# Patient Record
Sex: FEMALE | Race: WHITE | NOT HISPANIC OR LATINO | Employment: OTHER | URBAN - METROPOLITAN AREA
[De-identification: names, ages, dates, MRNs, and addresses within clinical notes are randomized per-mention and may not be internally consistent; named-entity substitution may affect disease eponyms.]

---

## 2017-03-10 ENCOUNTER — COMMUNICATION - HEALTHEAST (OUTPATIENT)
Dept: ADMINISTRATIVE | Facility: CLINIC | Age: 57
End: 2017-03-10

## 2017-04-11 ENCOUNTER — AMBULATORY - HEALTHEAST (OUTPATIENT)
Dept: CARDIOLOGY | Facility: CLINIC | Age: 57
End: 2017-04-11

## 2017-04-11 DIAGNOSIS — Q21.3 TETRALOGY OF FALLOT: ICD-10-CM

## 2017-06-22 ENCOUNTER — HOSPITAL ENCOUNTER (OUTPATIENT)
Dept: MRI IMAGING | Facility: HOSPITAL | Age: 57
Discharge: HOME | End: 2017-06-22
Attending: INTERNAL MEDICINE

## 2017-06-22 DIAGNOSIS — Q21.3 TETRALOGY OF FALLOT: ICD-10-CM

## 2017-06-30 ENCOUNTER — OFFICE VISIT - HEALTHEAST (OUTPATIENT)
Dept: CARDIOLOGY | Facility: CLINIC | Age: 57
End: 2017-06-30

## 2017-06-30 DIAGNOSIS — I49.49 OTHER PREMATURE BEATS: ICD-10-CM

## 2017-06-30 DIAGNOSIS — I10 ESSENTIAL HYPERTENSION WITH GOAL BLOOD PRESSURE LESS THAN 130/80: ICD-10-CM

## 2017-06-30 DIAGNOSIS — Q21.3 TETRALOGY OF FALLOT: ICD-10-CM

## 2017-06-30 DIAGNOSIS — Z87.74 TETRALOGY OF FALLOT S/P REPAIR: ICD-10-CM

## 2017-06-30 ASSESSMENT — MIFFLIN-ST. JEOR: SCORE: 1673.72

## 2017-07-10 ENCOUNTER — COMMUNICATION - HEALTHEAST (OUTPATIENT)
Dept: CARDIOLOGY | Facility: CLINIC | Age: 57
End: 2017-07-10

## 2017-08-24 ENCOUNTER — RECORDS - HEALTHEAST (OUTPATIENT)
Dept: LAB | Facility: CLINIC | Age: 57
End: 2017-08-24

## 2017-08-24 LAB
CHOLEST SERPL-MCNC: 157 MG/DL
FASTING STATUS PATIENT QL REPORTED: YES
HDLC SERPL-MCNC: 65 MG/DL
LDLC SERPL CALC-MCNC: 77 MG/DL
TRIGL SERPL-MCNC: 75 MG/DL

## 2017-12-27 ENCOUNTER — COMMUNICATION - HEALTHEAST (OUTPATIENT)
Dept: CARDIOLOGY | Facility: CLINIC | Age: 57
End: 2017-12-27

## 2017-12-27 DIAGNOSIS — Z87.74 TETRALOGY OF FALLOT S/P REPAIR: ICD-10-CM

## 2017-12-27 DIAGNOSIS — E87.6 HYPOPOTASSEMIA: ICD-10-CM

## 2017-12-27 DIAGNOSIS — I49.1 SUPRAVENTRICULAR PREMATURE BEATS: ICD-10-CM

## 2017-12-27 DIAGNOSIS — Z96.652 STATUS POST TOTAL LEFT KNEE REPLACEMENT USING CEMENT: ICD-10-CM

## 2018-04-02 ENCOUNTER — RECORDS - HEALTHEAST (OUTPATIENT)
Dept: LAB | Facility: CLINIC | Age: 58
End: 2018-04-02

## 2018-04-03 ENCOUNTER — AMBULATORY - HEALTHEAST (OUTPATIENT)
Dept: CARDIOLOGY | Facility: CLINIC | Age: 58
End: 2018-04-03

## 2018-04-03 DIAGNOSIS — I49.3 PVC'S (PREMATURE VENTRICULAR CONTRACTIONS): ICD-10-CM

## 2018-04-03 DIAGNOSIS — I45.10 RBBB: ICD-10-CM

## 2018-04-03 LAB
BASOPHILS # BLD AUTO: 0.1 THOU/UL (ref 0–0.2)
BASOPHILS NFR BLD AUTO: 1 % (ref 0–2)
EOSINOPHIL # BLD AUTO: 0.2 THOU/UL (ref 0–0.4)
EOSINOPHIL NFR BLD AUTO: 3 % (ref 0–6)
ERYTHROCYTE [DISTWIDTH] IN BLOOD BY AUTOMATED COUNT: 20.1 % (ref 11–14.5)
HCT VFR BLD AUTO: 32.3 % (ref 35–47)
HGB BLD-MCNC: 9.6 G/DL (ref 12–16)
LYMPHOCYTES # BLD AUTO: 2.7 THOU/UL (ref 0.8–4.4)
LYMPHOCYTES NFR BLD AUTO: 29 % (ref 20–40)
MCH RBC QN AUTO: 21.8 PG (ref 27–34)
MCHC RBC AUTO-ENTMCNC: 29.7 G/DL (ref 32–36)
MCV RBC AUTO: 73 FL (ref 80–100)
MONOCYTES # BLD AUTO: 1 THOU/UL (ref 0–0.9)
MONOCYTES NFR BLD AUTO: 10 % (ref 2–10)
NEUTROPHILS # BLD AUTO: 5.3 THOU/UL (ref 2–7.7)
NEUTROPHILS NFR BLD AUTO: 57 % (ref 50–70)
PLATELET # BLD AUTO: 325 THOU/UL (ref 140–440)
PMV BLD AUTO: 11.3 FL (ref 8.5–12.5)
POTASSIUM BLD-SCNC: 4 MMOL/L (ref 3.5–5)
RBC # BLD AUTO: 4.41 MILL/UL (ref 3.8–5.4)
WBC: 9.3 THOU/UL (ref 4–11)

## 2018-06-08 ENCOUNTER — RECORDS - HEALTHEAST (OUTPATIENT)
Dept: LAB | Facility: CLINIC | Age: 58
End: 2018-06-08

## 2018-06-08 LAB
BASOPHILS # BLD AUTO: 0.1 THOU/UL (ref 0–0.2)
BASOPHILS NFR BLD AUTO: 1 % (ref 0–2)
EOSINOPHIL # BLD AUTO: 0.1 THOU/UL (ref 0–0.4)
EOSINOPHIL NFR BLD AUTO: 1 % (ref 0–6)
ERYTHROCYTE [DISTWIDTH] IN BLOOD BY AUTOMATED COUNT: 19.6 % (ref 11–14.5)
HCT VFR BLD AUTO: 35.6 % (ref 35–47)
HGB BLD-MCNC: 10.3 G/DL (ref 12–16)
LYMPHOCYTES # BLD AUTO: 2.5 THOU/UL (ref 0.8–4.4)
LYMPHOCYTES NFR BLD AUTO: 28 % (ref 20–40)
MCH RBC QN AUTO: 21.2 PG (ref 27–34)
MCHC RBC AUTO-ENTMCNC: 28.9 G/DL (ref 32–36)
MCV RBC AUTO: 73 FL (ref 80–100)
MONOCYTES # BLD AUTO: 0.8 THOU/UL (ref 0–0.9)
MONOCYTES NFR BLD AUTO: 9 % (ref 2–10)
NEUTROPHILS # BLD AUTO: 5.6 THOU/UL (ref 2–7.7)
NEUTROPHILS NFR BLD AUTO: 62 % (ref 50–70)
PLATELET # BLD AUTO: 317 THOU/UL (ref 140–440)
PMV BLD AUTO: 10.7 FL (ref 8.5–12.5)
RBC # BLD AUTO: 4.85 MILL/UL (ref 3.8–5.4)
WBC: 9.2 THOU/UL (ref 4–11)

## 2018-06-09 LAB
BASOPHILS # BLD AUTO: 0.1 THOU/UL (ref 0–0.2)
BASOPHILS NFR BLD AUTO: 1 % (ref 0–2)
EOSINOPHIL # BLD AUTO: 0.1 THOU/UL (ref 0–0.4)
EOSINOPHIL NFR BLD AUTO: 1 % (ref 0–6)
ERYTHROCYTE [DISTWIDTH] IN BLOOD BY AUTOMATED COUNT: 19.6 % (ref 11–14.5)
HCT VFR BLD AUTO: 35.6 % (ref 35–47)
HGB BLD-MCNC: 10.3 G/DL (ref 12–16)
LYMPHOCYTES # BLD AUTO: 2.5 THOU/UL (ref 0.8–4.4)
LYMPHOCYTES NFR BLD AUTO: 28 % (ref 20–40)
MCH RBC QN AUTO: 21.2 PG (ref 27–34)
MCHC RBC AUTO-ENTMCNC: 28.9 G/DL (ref 32–36)
MCV RBC AUTO: 73 FL (ref 80–100)
MONOCYTES # BLD AUTO: 0.8 THOU/UL (ref 0–0.9)
MONOCYTES NFR BLD AUTO: 9 % (ref 2–10)
NEUTROPHILS # BLD AUTO: 5.6 THOU/UL (ref 2–7.7)
NEUTROPHILS NFR BLD AUTO: 62 % (ref 50–70)
OVALOCYTES: ABNORMAL
PATH REPORT.MICROSCOPIC SPEC OTHER STN: ABNORMAL
PLAT MORPH BLD: NORMAL
PLATELET # BLD AUTO: 317 THOU/UL (ref 140–440)
PMV BLD AUTO: 10.7 FL (ref 8.5–12.5)
RBC # BLD AUTO: 4.85 MILL/UL (ref 3.8–5.4)
TOXIC GRANULATION: ABNORMAL
WBC: 9.2 THOU/UL (ref 4–11)

## 2018-06-11 LAB
LAB AP CHARGES (HE HISTORICAL CONVERSION): NORMAL
PATH REPORT.COMMENTS IMP SPEC: NORMAL
PATH REPORT.COMMENTS IMP SPEC: NORMAL
PATH REPORT.FINAL DX SPEC: NORMAL
PATH REPORT.RELEVANT HX SPEC: NORMAL

## 2018-07-19 ENCOUNTER — AMBULATORY - HEALTHEAST (OUTPATIENT)
Dept: CARDIOLOGY | Facility: CLINIC | Age: 58
End: 2018-07-19

## 2018-07-19 DIAGNOSIS — I10 ESSENTIAL HYPERTENSION: ICD-10-CM

## 2018-07-19 DIAGNOSIS — Z87.74 STATUS POST REPAIR OF TETRALOGY OF FALLOT: ICD-10-CM

## 2018-07-19 DIAGNOSIS — Q21.3 TETRALOGY OF FALLOT: ICD-10-CM

## 2018-07-19 DIAGNOSIS — I49.3 PVC'S (PREMATURE VENTRICULAR CONTRACTIONS): ICD-10-CM

## 2018-07-20 ENCOUNTER — COMMUNICATION - HEALTHEAST (OUTPATIENT)
Dept: ADMINISTRATIVE | Facility: CLINIC | Age: 58
End: 2018-07-20

## 2018-08-31 ENCOUNTER — RECORDS - HEALTHEAST (OUTPATIENT)
Dept: LAB | Facility: CLINIC | Age: 58
End: 2018-08-31

## 2018-08-31 LAB
BASOPHILS # BLD AUTO: 0.1 THOU/UL (ref 0–0.2)
BASOPHILS NFR BLD AUTO: 1 % (ref 0–2)
EOSINOPHIL # BLD AUTO: 0.2 THOU/UL (ref 0–0.4)
EOSINOPHIL NFR BLD AUTO: 2 % (ref 0–6)
ERYTHROCYTE [DISTWIDTH] IN BLOOD BY AUTOMATED COUNT: 20.8 % (ref 11–14.5)
HCT VFR BLD AUTO: 35 % (ref 35–47)
HGB BLD-MCNC: 10.6 G/DL (ref 12–16)
LYMPHOCYTES # BLD AUTO: 2.3 THOU/UL (ref 0.8–4.4)
LYMPHOCYTES NFR BLD AUTO: 28 % (ref 20–40)
MCH RBC QN AUTO: 22 PG (ref 27–34)
MCHC RBC AUTO-ENTMCNC: 30.3 G/DL (ref 32–36)
MCV RBC AUTO: 73 FL (ref 80–100)
MONOCYTES # BLD AUTO: 0.9 THOU/UL (ref 0–0.9)
MONOCYTES NFR BLD AUTO: 11 % (ref 2–10)
NEUTROPHILS # BLD AUTO: 4.8 THOU/UL (ref 2–7.7)
NEUTROPHILS NFR BLD AUTO: 58 % (ref 50–70)
OVALOCYTES: ABNORMAL
PLAT MORPH BLD: NORMAL
PLATELET # BLD AUTO: 308 THOU/UL (ref 140–440)
PMV BLD AUTO: 11.2 FL (ref 8.5–12.5)
RBC # BLD AUTO: 4.82 MILL/UL (ref 3.8–5.4)
WBC: 8.3 THOU/UL (ref 4–11)

## 2018-09-11 ENCOUNTER — RECORDS - HEALTHEAST (OUTPATIENT)
Dept: LAB | Facility: CLINIC | Age: 58
End: 2018-09-11

## 2018-09-11 LAB
ALBUMIN SERPL-MCNC: 3.9 G/DL (ref 3.5–5)
ANION GAP SERPL CALCULATED.3IONS-SCNC: 10 MMOL/L (ref 5–18)
BASOPHILS # BLD AUTO: 0.1 THOU/UL (ref 0–0.2)
BASOPHILS NFR BLD AUTO: 1 % (ref 0–2)
BNP SERPL-MCNC: 52 PG/ML (ref 0–87)
BUN SERPL-MCNC: 11 MG/DL (ref 8–22)
CALCIUM SERPL-MCNC: 9.5 MG/DL (ref 8.5–10.5)
CHLORIDE BLD-SCNC: 105 MMOL/L (ref 98–107)
CO2 SERPL-SCNC: 26 MMOL/L (ref 22–31)
CREAT SERPL-MCNC: 0.67 MG/DL (ref 0.6–1.1)
EOSINOPHIL # BLD AUTO: 0.2 THOU/UL (ref 0–0.4)
EOSINOPHIL NFR BLD AUTO: 2 % (ref 0–6)
ERYTHROCYTE [DISTWIDTH] IN BLOOD BY AUTOMATED COUNT: 20.9 % (ref 11–14.5)
FERRITIN SERPL-MCNC: 14 NG/ML (ref 10–130)
GFR SERPL CREATININE-BSD FRML MDRD: >60 ML/MIN/1.73M2
GLUCOSE BLD-MCNC: 81 MG/DL (ref 70–125)
HCT VFR BLD AUTO: 37.1 % (ref 35–47)
HGB BLD-MCNC: 11.4 G/DL (ref 12–16)
IRON SATN MFR SERPL: 5 % (ref 20–50)
IRON SERPL-MCNC: 25 UG/DL (ref 42–175)
LYMPHOCYTES # BLD AUTO: 2.5 THOU/UL (ref 0.8–4.4)
LYMPHOCYTES NFR BLD AUTO: 28 % (ref 20–40)
MCH RBC QN AUTO: 22.2 PG (ref 27–34)
MCHC RBC AUTO-ENTMCNC: 30.7 G/DL (ref 32–36)
MCV RBC AUTO: 72 FL (ref 80–100)
MONOCYTES # BLD AUTO: 0.9 THOU/UL (ref 0–0.9)
MONOCYTES NFR BLD AUTO: 10 % (ref 2–10)
NEUTROPHILS # BLD AUTO: 5.2 THOU/UL (ref 2–7.7)
NEUTROPHILS NFR BLD AUTO: 59 % (ref 50–70)
OVALOCYTES: ABNORMAL
PHOSPHATE SERPL-MCNC: 4.1 MG/DL (ref 2.5–4.5)
PLAT MORPH BLD: NORMAL
PLATELET # BLD AUTO: 292 THOU/UL (ref 140–440)
PMV BLD AUTO: 10.3 FL (ref 8.5–12.5)
POLYCHROMASIA BLD QL SMEAR: ABNORMAL
POTASSIUM BLD-SCNC: 4.1 MMOL/L (ref 3.5–5)
RBC # BLD AUTO: 5.13 MILL/UL (ref 3.8–5.4)
SODIUM SERPL-SCNC: 141 MMOL/L (ref 136–145)
TIBC SERPL-MCNC: 510 UG/DL (ref 313–563)
TRANSFERRIN SERPL-MCNC: 408 MG/DL (ref 212–360)
WBC: 8.9 THOU/UL (ref 4–11)

## 2018-12-02 ENCOUNTER — COMMUNICATION - HEALTHEAST (OUTPATIENT)
Dept: CARDIOLOGY | Facility: CLINIC | Age: 58
End: 2018-12-02

## 2018-12-02 DIAGNOSIS — Z87.74 TETRALOGY OF FALLOT S/P REPAIR: ICD-10-CM

## 2018-12-02 DIAGNOSIS — Z96.652 STATUS POST TOTAL LEFT KNEE REPLACEMENT USING CEMENT: ICD-10-CM

## 2018-12-02 DIAGNOSIS — I49.1 SUPRAVENTRICULAR PREMATURE BEATS: ICD-10-CM

## 2018-12-02 DIAGNOSIS — E87.6 HYPOPOTASSEMIA: ICD-10-CM

## 2018-12-07 ENCOUNTER — RECORDS - HEALTHEAST (OUTPATIENT)
Dept: LAB | Facility: CLINIC | Age: 58
End: 2018-12-07

## 2018-12-07 LAB
25(OH)D3 SERPL-MCNC: 42.2 NG/ML (ref 30–80)
ALBUMIN SERPL-MCNC: 3.5 G/DL (ref 3.5–5)
ALP SERPL-CCNC: 112 U/L (ref 45–120)
ALT SERPL W P-5'-P-CCNC: 16 U/L (ref 0–45)
ANION GAP SERPL CALCULATED.3IONS-SCNC: 6 MMOL/L (ref 5–18)
AST SERPL W P-5'-P-CCNC: 18 U/L (ref 0–40)
BASOPHILS # BLD AUTO: 0.1 THOU/UL (ref 0–0.2)
BASOPHILS NFR BLD AUTO: 1 % (ref 0–2)
BILIRUB SERPL-MCNC: 0.4 MG/DL (ref 0–1)
BUN SERPL-MCNC: 15 MG/DL (ref 8–22)
CALCIUM SERPL-MCNC: 9.2 MG/DL (ref 8.5–10.5)
CHLORIDE BLD-SCNC: 103 MMOL/L (ref 98–107)
CHOLEST SERPL-MCNC: 155 MG/DL
CO2 SERPL-SCNC: 29 MMOL/L (ref 22–31)
CREAT SERPL-MCNC: 0.77 MG/DL (ref 0.6–1.1)
EOSINOPHIL # BLD AUTO: 0.3 THOU/UL (ref 0–0.4)
EOSINOPHIL NFR BLD AUTO: 4 % (ref 0–6)
ERYTHROCYTE [DISTWIDTH] IN BLOOD BY AUTOMATED COUNT: 19.1 % (ref 11–14.5)
FASTING STATUS PATIENT QL REPORTED: YES
FERRITIN SERPL-MCNC: 19 NG/ML (ref 10–130)
FOLATE SERPL-MCNC: 7.1 NG/ML
GFR SERPL CREATININE-BSD FRML MDRD: >60 ML/MIN/1.73M2
GLUCOSE BLD-MCNC: 86 MG/DL (ref 70–125)
HCT VFR BLD AUTO: 36.5 % (ref 35–47)
HDLC SERPL-MCNC: 64 MG/DL
HGB BLD-MCNC: 11.1 G/DL (ref 12–16)
IRON SATN MFR SERPL: 5 % (ref 20–50)
IRON SERPL-MCNC: 26 UG/DL (ref 42–175)
LDLC SERPL CALC-MCNC: 76 MG/DL
LYMPHOCYTES # BLD AUTO: 2.4 THOU/UL (ref 0.8–4.4)
LYMPHOCYTES NFR BLD AUTO: 32 % (ref 20–40)
MAGNESIUM SERPL-MCNC: 2.3 MG/DL (ref 1.8–2.6)
MCH RBC QN AUTO: 23.1 PG (ref 27–34)
MCHC RBC AUTO-ENTMCNC: 30.4 G/DL (ref 32–36)
MCV RBC AUTO: 76 FL (ref 80–100)
MONOCYTES # BLD AUTO: 0.7 THOU/UL (ref 0–0.9)
MONOCYTES NFR BLD AUTO: 10 % (ref 2–10)
NEUTROPHILS # BLD AUTO: 4.1 THOU/UL (ref 2–7.7)
NEUTROPHILS NFR BLD AUTO: 54 % (ref 50–70)
PLATELET # BLD AUTO: 293 THOU/UL (ref 140–440)
PMV BLD AUTO: 10 FL (ref 8.5–12.5)
POTASSIUM BLD-SCNC: 3.8 MMOL/L (ref 3.5–5)
PROT SERPL-MCNC: 7 G/DL (ref 6–8)
PTH-INTACT SERPL-MCNC: 81 PG/ML (ref 10–86)
RBC # BLD AUTO: 4.8 MILL/UL (ref 3.8–5.4)
SODIUM SERPL-SCNC: 138 MMOL/L (ref 136–145)
T4 FREE SERPL-MCNC: 0.5 NG/DL (ref 0.7–1.8)
TIBC SERPL-MCNC: 492 UG/DL (ref 313–563)
TRANSFERRIN SERPL-MCNC: 394 MG/DL (ref 212–360)
TRIGL SERPL-MCNC: 75 MG/DL
TSH SERPL DL<=0.005 MIU/L-ACNC: 1.15 UIU/ML (ref 0.3–5)
VIT B12 SERPL-MCNC: 812 PG/ML (ref 213–816)
WBC: 7.6 THOU/UL (ref 4–11)

## 2018-12-09 LAB
COPPER SERPL-MCNC: 148 UG/DL (ref 80–155)
ZINC SERPL-MCNC: 63 UG/DL (ref 60–120)

## 2018-12-10 LAB
ANNOTATION COMMENT IMP: NORMAL
VIT A SERPL-MCNC: 0.49 MG/L (ref 0.3–1.2)
VITAMIN A (RETINYL PALMITATE): 0.02 MG/L (ref 0–0.1)

## 2018-12-11 LAB — VIT B1 PYROPHOSHATE BLD-SCNC: 105 NMOL/L (ref 70–180)

## 2018-12-15 ENCOUNTER — COMMUNICATION - HEALTHEAST (OUTPATIENT)
Dept: CARDIOLOGY | Facility: CLINIC | Age: 58
End: 2018-12-15

## 2018-12-15 DIAGNOSIS — I49.1 SUPRAVENTRICULAR PREMATURE BEATS: ICD-10-CM

## 2018-12-15 DIAGNOSIS — E87.6 HYPOPOTASSEMIA: ICD-10-CM

## 2018-12-15 DIAGNOSIS — Z87.74 TETRALOGY OF FALLOT S/P REPAIR: ICD-10-CM

## 2018-12-15 DIAGNOSIS — Z96.652 STATUS POST TOTAL LEFT KNEE REPLACEMENT USING CEMENT: ICD-10-CM

## 2019-02-11 ENCOUNTER — COMMUNICATION - HEALTHEAST (OUTPATIENT)
Dept: CARDIOLOGY | Facility: CLINIC | Age: 59
End: 2019-02-11

## 2019-02-11 DIAGNOSIS — Z87.74 TETRALOGY OF FALLOT S/P REPAIR: ICD-10-CM

## 2019-02-11 DIAGNOSIS — Z96.652 STATUS POST TOTAL LEFT KNEE REPLACEMENT USING CEMENT: ICD-10-CM

## 2019-02-11 DIAGNOSIS — I49.1 SUPRAVENTRICULAR PREMATURE BEATS: ICD-10-CM

## 2019-02-11 DIAGNOSIS — E87.6 HYPOPOTASSEMIA: ICD-10-CM

## 2019-02-15 ENCOUNTER — COMMUNICATION - HEALTHEAST (OUTPATIENT)
Dept: CARDIOLOGY | Facility: CLINIC | Age: 59
End: 2019-02-15

## 2019-02-15 DIAGNOSIS — I49.1 SUPRAVENTRICULAR PREMATURE BEATS: ICD-10-CM

## 2019-02-15 DIAGNOSIS — E87.6 HYPOPOTASSEMIA: ICD-10-CM

## 2019-02-15 DIAGNOSIS — Z87.74 TETRALOGY OF FALLOT S/P REPAIR: ICD-10-CM

## 2019-02-15 DIAGNOSIS — Z96.652 STATUS POST TOTAL LEFT KNEE REPLACEMENT USING CEMENT: ICD-10-CM

## 2019-03-14 ENCOUNTER — RECORDS - HEALTHEAST (OUTPATIENT)
Dept: LAB | Facility: CLINIC | Age: 59
End: 2019-03-14

## 2019-03-14 LAB
FERRITIN SERPL-MCNC: 20 NG/ML (ref 10–130)
IRON SATN MFR SERPL: 6 % (ref 20–50)
IRON SERPL-MCNC: 27 UG/DL (ref 42–175)
TIBC SERPL-MCNC: 456 UG/DL (ref 313–563)
TRANSFERRIN SERPL-MCNC: 365 MG/DL (ref 212–360)

## 2019-06-17 ENCOUNTER — RECORDS - HEALTHEAST (OUTPATIENT)
Dept: LAB | Facility: CLINIC | Age: 59
End: 2019-06-17

## 2019-06-17 LAB
ALBUMIN SERPL-MCNC: 3.4 G/DL (ref 3.5–5)
ALP SERPL-CCNC: 104 U/L (ref 45–120)
ALT SERPL W P-5'-P-CCNC: 19 U/L (ref 0–45)
ANION GAP SERPL CALCULATED.3IONS-SCNC: 5 MMOL/L (ref 5–18)
AST SERPL W P-5'-P-CCNC: 18 U/L (ref 0–40)
BILIRUB SERPL-MCNC: 0.3 MG/DL (ref 0–1)
BUN SERPL-MCNC: 9 MG/DL (ref 8–22)
CALCIUM SERPL-MCNC: 9 MG/DL (ref 8.5–10.5)
CHLORIDE BLD-SCNC: 106 MMOL/L (ref 98–107)
CHOLEST SERPL-MCNC: 157 MG/DL
CO2 SERPL-SCNC: 29 MMOL/L (ref 22–31)
CREAT SERPL-MCNC: 0.58 MG/DL (ref 0.6–1.1)
FASTING STATUS PATIENT QL REPORTED: NO
GFR SERPL CREATININE-BSD FRML MDRD: >60 ML/MIN/1.73M2
GLUCOSE BLD-MCNC: 66 MG/DL (ref 70–125)
HDLC SERPL-MCNC: 57 MG/DL
IRON SATN MFR SERPL: 19 % (ref 20–50)
IRON SERPL-MCNC: 78 UG/DL (ref 42–175)
LDLC SERPL CALC-MCNC: 81 MG/DL
MAGNESIUM SERPL-MCNC: 2 MG/DL (ref 1.8–2.6)
POTASSIUM BLD-SCNC: 4.2 MMOL/L (ref 3.5–5)
PROT SERPL-MCNC: 5.8 G/DL (ref 6–8)
SODIUM SERPL-SCNC: 140 MMOL/L (ref 136–145)
TIBC SERPL-MCNC: 407 UG/DL (ref 313–563)
TRANSFERRIN SERPL-MCNC: 325 MG/DL (ref 212–360)
TRIGL SERPL-MCNC: 95 MG/DL

## 2019-06-27 ENCOUNTER — RECORDS - HEALTHEAST (OUTPATIENT)
Dept: ADMINISTRATIVE | Facility: OTHER | Age: 59
End: 2019-06-27

## 2019-06-27 ENCOUNTER — AMBULATORY - HEALTHEAST (OUTPATIENT)
Dept: CARDIOLOGY | Facility: CLINIC | Age: 59
End: 2019-06-27

## 2019-07-02 ENCOUNTER — OFFICE VISIT - HEALTHEAST (OUTPATIENT)
Dept: CARDIOLOGY | Facility: CLINIC | Age: 59
End: 2019-07-02

## 2019-07-02 DIAGNOSIS — I49.3 PVC'S (PREMATURE VENTRICULAR CONTRACTIONS): ICD-10-CM

## 2019-07-02 DIAGNOSIS — Q21.3 TETRALOGY OF FALLOT: ICD-10-CM

## 2019-07-02 RX ORDER — DULOXETIN HYDROCHLORIDE 30 MG/1
60 CAPSULE, DELAYED RELEASE ORAL EVERY MORNING
Status: SHIPPED | COMMUNITY
Start: 2019-07-02

## 2019-07-02 ASSESSMENT — MIFFLIN-ST. JEOR: SCORE: 1699.57

## 2019-07-03 LAB
ATRIAL RATE - MUSE: 75 BPM
DIASTOLIC BLOOD PRESSURE - MUSE: NORMAL MMHG
INTERPRETATION ECG - MUSE: NORMAL
P AXIS - MUSE: 49 DEGREES
PR INTERVAL - MUSE: 142 MS
QRS DURATION - MUSE: 152 MS
QT - MUSE: 418 MS
QTC - MUSE: 466 MS
R AXIS - MUSE: 18 DEGREES
SYSTOLIC BLOOD PRESSURE - MUSE: NORMAL MMHG
T AXIS - MUSE: 86 DEGREES
VENTRICULAR RATE- MUSE: 75 BPM

## 2019-07-18 ENCOUNTER — HOSPITAL ENCOUNTER (OUTPATIENT)
Dept: CARDIOLOGY | Facility: CLINIC | Age: 59
Discharge: HOME | End: 2019-07-18
Attending: INTERNAL MEDICINE

## 2019-07-18 DIAGNOSIS — Q21.3 TETRALOGY OF FALLOT: ICD-10-CM

## 2019-07-18 DIAGNOSIS — I49.3 PVC'S (PREMATURE VENTRICULAR CONTRACTIONS): ICD-10-CM

## 2019-07-18 ASSESSMENT — MIFFLIN-ST. JEOR: SCORE: 1699.09

## 2019-07-19 LAB
AORTIC ROOT: 3.7 CM
BSA FOR ECHO PROCEDURE: 2.27 M2
CV BLOOD PRESSURE: ABNORMAL MMHG
CV ECHO HEIGHT: 63.8 IN
CV ECHO WEIGHT: 252 LBS
DOP CALC LVOT AREA: 2.83 CM2
DOP CALC LVOT DIAMETER: 1.9 CM
DOP CALC LVOT PEAK VEL: 96.8 CM/S
DOP CALC LVOT STROKE VOLUME: 55.5 CM3
DOP CALC QP:QS RATIO: 1.5
DOP CALC RVOT AREA: 3.14 CM2
DOP CALC RVOT DIAMETER: 2 CM
DOP CALC RVOT PEAK VEL: 116 CM/S
DOP CALC RVOT STROKE VOLUME: 84.2 CM3
DOP CALC RVOT VTI: 26.8 CM
DOP CALCLVOT PEAK VEL VTI: 19.6 CM
EJECTION FRACTION: 64 % (ref 55–75)
FRACTIONAL SHORTENING: 36 % (ref 28–44)
INTERVENTRICULAR SEPTUM IN END DIASTOLE: 1.15 CM (ref 0.6–0.9)
IVS/PW RATIO: 1
LEFT ATRIUM SIZE: 4.1 CM
LEFT ATRIUM TO AORTIC ROOT RATIO: 1.11 NO UNITS
LEFT VENTRICLE CARDIAC INDEX: 2.1 L/MIN/M2
LEFT VENTRICLE CARDIAC OUTPUT: 4.8 L/MIN
LEFT VENTRICLE DIASTOLIC VOLUME INDEX: 46.3 CM3/M2 (ref 29–61)
LEFT VENTRICLE DIASTOLIC VOLUME: 105 CM3 (ref 46–106)
LEFT VENTRICLE HEART RATE: 87 BPM
LEFT VENTRICLE MASS INDEX: 102.1 G/M2
LEFT VENTRICLE SYSTOLIC VOLUME INDEX: 16.5 CM3/M2 (ref 8–24)
LEFT VENTRICLE SYSTOLIC VOLUME: 37.5 CM3 (ref 14–42)
LEFT VENTRICULAR INTERNAL DIMENSION IN DIASTOLE: 5.16 CM (ref 3.8–5.2)
LEFT VENTRICULAR INTERNAL DIMENSION IN SYSTOLE: 3.3 CM (ref 2.2–3.5)
LEFT VENTRICULAR MASS: 231.7 G
LEFT VENTRICULAR OUTFLOW TRACT MEAN GRADIENT: 2 MMHG
LEFT VENTRICULAR OUTFLOW TRACT MEAN VELOCITY: 71.5 CM/S
LEFT VENTRICULAR OUTFLOW TRACT PEAK GRADIENT: 4 MMHG
LEFT VENTRICULAR POSTERIOR WALL IN END DIASTOLE: 1.15 CM (ref 0.6–0.9)
LV STROKE VOLUME INDEX: 24.5 ML/M2
MITRAL VALVE E/A RATIO: 1.1
MV AVERAGE E/E' RATIO: 8.6 CM/S
MV DECELERATION TIME: 218 MS
MV E'TISSUE VEL-LAT: 14.3 CM/S
MV E'TISSUE VEL-MED: 8.8 CM/S
MV LATERAL E/E' RATIO: 7
MV MEDIAL E/E' RATIO: 11.3
MV PEAK A VELOCITY: 89.2 CM/S
MV PEAK E VELOCITY: 99.4 CM/S
NUC REST DIASTOLIC VOLUME INDEX: 4032 LBS
NUC REST SYSTOLIC VOLUME INDEX: 63.75 IN
PV MEAN GRADIENT: 18 MMHG
PV MEAN VELOCITY: 196 CM/S
PV PEAK GRADIENT: 28.9 MMHG
PV VALVE AREA: 1.2 CM2
PV VELOCITY TIME INTERVAL: 70.1 CM
PV VMAX: 269 CM/S
RIGHT VENTRICULAR OUTFLOW PEAK GRADIENT: 5 MMHG
RIGHT VENTRICULAR OUTFLOW TRACT MEAN GRADIENT: 3 MMHG
RVOT MV: 86 CM/S
TRICUSPID REGURGITATION PEAK PRESSURE GRADIENT: 30.9 MMHG
TRICUSPID VALVE PEAK REGURGITANT VELOCITY: 278 CM/S

## 2019-07-29 ENCOUNTER — COMMUNICATION - HEALTHEAST (OUTPATIENT)
Dept: CARDIOLOGY | Facility: CLINIC | Age: 59
End: 2019-07-29

## 2019-07-29 DIAGNOSIS — Z87.74 TETRALOGY OF FALLOT S/P REPAIR: ICD-10-CM

## 2019-07-29 DIAGNOSIS — Q21.3 TETRALOGY OF FALLOT: ICD-10-CM

## 2019-08-07 ENCOUNTER — COMMUNICATION - HEALTHEAST (OUTPATIENT)
Dept: CARDIOLOGY | Facility: CLINIC | Age: 59
End: 2019-08-07

## 2019-08-09 ENCOUNTER — HOSPITAL ENCOUNTER (OUTPATIENT)
Dept: MRI IMAGING | Facility: HOSPITAL | Age: 59
Discharge: HOME | End: 2019-08-09
Attending: INTERNAL MEDICINE

## 2019-08-09 DIAGNOSIS — Z87.74 TETRALOGY OF FALLOT S/P REPAIR: ICD-10-CM

## 2019-08-09 LAB
CREAT BLD-MCNC: 0.6 MG/DL (ref 0.6–1.1)
GFR SERPL CREATININE-BSD FRML MDRD: >60 ML/MIN/1.73M2

## 2019-08-13 LAB
CCTA EJECTION FRACTION: 56 %
CCTA INTERVENTRICULAR SETPUM: 0.9 CM (ref 0.6–1.1)
CCTA LEFT INTERNAL DIMENSION IN SYSTOLE: 3.9 CM (ref 2.1–4)
CCTA LEFT VENTRICULAR INTERNAL DIMENSION IN DIASTOLE: 5.8 CM (ref 3.5–6)
CCTA LEFT VENTRICULAR MASS: 203.49 G
CCTA POSTERIOR WALL: 0.9 CM (ref 0.6–1.1)
MR CARDIAC LEFT VENTRIAL CARDIAC INDEX: 3.9 L/MIN/M2 (ref 1.75–3.8)
MR CARDIAC LEFT VENTRICAL CARDIAC OUTPUT: 8.3 L/MIN (ref 2.8–8.8)
MR CARDIAC LEFT VENTRICULAR DIASTOLIC VOLUME INDEX: 84.59 ML/M2 (ref 41–81)
MR CARDIAC LEFT VENTRICULAR MASS INDEX: 63.21 G/M2 (ref 63–95)
MR CARDIAC LEFT VENTRICULAR MASS: 136 G (ref 75–175)
MR CARDIAC LEFT VENTRICULAR STROKE VOLUME INDEX: 38.58 ML/M2 (ref 26–56)
MR CARDIAC LEFT VENTRICULAR SYSTOLIC VOLUME INDEX: 46.01 ML/M2 (ref 12–20)
MR EJECTION FRACTION: 45.6 %
MR HEIGHT: 1.62 M
MR LEFT VENTRICULAR DYSTOLIC VOLUMEN: 182 ML (ref 52–141)
MR LEFT VENTRICULAR STROKE VOLUMEN: 83 ML (ref 33–97)
MR LEFT VENTRICULAR SYSTOLIC VOLUME: 99 ML (ref 13–51)
MR WEIGHT: 114.3 KG

## 2019-08-15 ENCOUNTER — COMMUNICATION - HEALTHEAST (OUTPATIENT)
Dept: CARDIOLOGY | Facility: CLINIC | Age: 59
End: 2019-08-15

## 2019-09-15 ENCOUNTER — COMMUNICATION - HEALTHEAST (OUTPATIENT)
Dept: CARDIOLOGY | Facility: CLINIC | Age: 59
End: 2019-09-15

## 2019-10-10 ENCOUNTER — RECORDS - HEALTHEAST (OUTPATIENT)
Dept: LAB | Facility: CLINIC | Age: 59
End: 2019-10-10

## 2019-10-11 ENCOUNTER — COMMUNICATION - HEALTHEAST (OUTPATIENT)
Dept: CARDIOLOGY | Facility: CLINIC | Age: 59
End: 2019-10-11

## 2019-10-11 LAB
ALBUMIN SERPL-MCNC: 3.7 G/DL (ref 3.5–5)
ALP SERPL-CCNC: 110 U/L (ref 45–120)
ALT SERPL W P-5'-P-CCNC: 24 U/L (ref 0–45)
ANION GAP SERPL CALCULATED.3IONS-SCNC: 9 MMOL/L (ref 5–18)
AST SERPL W P-5'-P-CCNC: 19 U/L (ref 0–40)
BILIRUB SERPL-MCNC: 0.3 MG/DL (ref 0–1)
BUN SERPL-MCNC: 11 MG/DL (ref 8–22)
C REACTIVE PROTEIN LHE: 0.2 MG/DL (ref 0–0.8)
CALCIUM SERPL-MCNC: 9.3 MG/DL (ref 8.5–10.5)
CHLORIDE BLD-SCNC: 107 MMOL/L (ref 98–107)
CO2 SERPL-SCNC: 28 MMOL/L (ref 22–31)
CREAT SERPL-MCNC: 0.7 MG/DL (ref 0.6–1.1)
ERYTHROCYTE [SEDIMENTATION RATE] IN BLOOD BY WESTERGREN METHOD: 11 MM/HR (ref 0–20)
GFR SERPL CREATININE-BSD FRML MDRD: >60 ML/MIN/1.73M2
GLUCOSE BLD-MCNC: 108 MG/DL (ref 70–125)
IRON SATN MFR SERPL: 13 % (ref 20–50)
IRON SERPL-MCNC: 57 UG/DL (ref 42–175)
POTASSIUM BLD-SCNC: 4.4 MMOL/L (ref 3.5–5)
PROT SERPL-MCNC: 6.4 G/DL (ref 6–8)
SODIUM SERPL-SCNC: 144 MMOL/L (ref 136–145)
TIBC SERPL-MCNC: 432 UG/DL (ref 313–563)
TRANSFERRIN SERPL-MCNC: 346 MG/DL (ref 212–360)

## 2019-10-24 ASSESSMENT — MIFFLIN-ST. JEOR: SCORE: 1690.02

## 2019-10-28 ASSESSMENT — MIFFLIN-ST. JEOR: SCORE: 1730.85

## 2019-10-29 ENCOUNTER — ANESTHESIA - HEALTHEAST (OUTPATIENT)
Dept: SURGERY | Facility: CLINIC | Age: 59
End: 2019-10-29

## 2019-10-29 ENCOUNTER — SURGERY - HEALTHEAST (OUTPATIENT)
Dept: SURGERY | Facility: CLINIC | Age: 59
End: 2019-10-29

## 2019-10-29 ASSESSMENT — MIFFLIN-ST. JEOR: SCORE: 1690.02

## 2020-01-29 ENCOUNTER — COMMUNICATION - HEALTHEAST (OUTPATIENT)
Dept: CARDIOLOGY | Facility: CLINIC | Age: 60
End: 2020-01-29

## 2020-01-31 ASSESSMENT — MIFFLIN-ST. JEOR: SCORE: 1693.99

## 2020-02-13 ENCOUNTER — RECORDS - HEALTHEAST (OUTPATIENT)
Dept: LAB | Facility: CLINIC | Age: 60
End: 2020-02-13

## 2020-02-13 LAB
ANION GAP SERPL CALCULATED.3IONS-SCNC: 9 MMOL/L (ref 5–18)
BUN SERPL-MCNC: 13 MG/DL (ref 8–22)
CALCIUM SERPL-MCNC: 10.1 MG/DL (ref 8.5–10.5)
CHLORIDE BLD-SCNC: 104 MMOL/L (ref 98–107)
CO2 SERPL-SCNC: 29 MMOL/L (ref 22–31)
CREAT SERPL-MCNC: 0.67 MG/DL (ref 0.6–1.1)
GFR SERPL CREATININE-BSD FRML MDRD: >60 ML/MIN/1.73M2
GLUCOSE BLD-MCNC: 83 MG/DL (ref 70–125)
POTASSIUM BLD-SCNC: 4.8 MMOL/L (ref 3.5–5)
SODIUM SERPL-SCNC: 142 MMOL/L (ref 136–145)
TSH SERPL DL<=0.005 MIU/L-ACNC: 0.8 UIU/ML (ref 0.3–5)

## 2020-02-14 ENCOUNTER — RECORDS - HEALTHEAST (OUTPATIENT)
Dept: ADMINISTRATIVE | Facility: OTHER | Age: 60
End: 2020-02-14

## 2020-02-14 ENCOUNTER — AMBULATORY - HEALTHEAST (OUTPATIENT)
Dept: CARDIOLOGY | Facility: CLINIC | Age: 60
End: 2020-02-14

## 2020-02-19 ENCOUNTER — OFFICE VISIT - HEALTHEAST (OUTPATIENT)
Dept: CARDIOLOGY | Facility: CLINIC | Age: 60
End: 2020-02-19

## 2020-02-19 DIAGNOSIS — Z87.74 TETRALOGY OF FALLOT S/P REPAIR: ICD-10-CM

## 2020-02-19 DIAGNOSIS — I50.32 CHRONIC DIASTOLIC HEART FAILURE (H): ICD-10-CM

## 2020-02-19 ASSESSMENT — MIFFLIN-ST. JEOR: SCORE: 1689.46

## 2020-02-24 ASSESSMENT — MIFFLIN-ST. JEOR
SCORE: 1681.52
SCORE: 1695.12

## 2020-02-26 ENCOUNTER — ANESTHESIA - HEALTHEAST (OUTPATIENT)
Dept: SURGERY | Facility: CLINIC | Age: 60
End: 2020-02-26

## 2020-02-27 ENCOUNTER — SURGERY - HEALTHEAST (OUTPATIENT)
Dept: SURGERY | Facility: CLINIC | Age: 60
End: 2020-02-27

## 2020-02-28 ASSESSMENT — MIFFLIN-ST. JEOR: SCORE: 1693.99

## 2020-04-27 ENCOUNTER — RECORDS - HEALTHEAST (OUTPATIENT)
Dept: ADMINISTRATIVE | Facility: OTHER | Age: 60
End: 2020-04-27

## 2020-04-28 ENCOUNTER — OFFICE VISIT - HEALTHEAST (OUTPATIENT)
Dept: CARDIOLOGY | Facility: CLINIC | Age: 60
End: 2020-04-28

## 2020-04-28 DIAGNOSIS — Q21.3 TETRALOGY OF FALLOT: ICD-10-CM

## 2020-04-28 DIAGNOSIS — Z87.74 TETRALOGY OF FALLOT S/P REPAIR: ICD-10-CM

## 2020-04-28 DIAGNOSIS — I49.1 SUPRAVENTRICULAR PREMATURE BEATS: ICD-10-CM

## 2020-04-28 DIAGNOSIS — I49.3 PVC'S (PREMATURE VENTRICULAR CONTRACTIONS): ICD-10-CM

## 2020-08-25 ENCOUNTER — RECORDS - HEALTHEAST (OUTPATIENT)
Dept: LAB | Facility: CLINIC | Age: 60
End: 2020-08-25

## 2020-08-25 LAB
ANION GAP SERPL CALCULATED.3IONS-SCNC: 10 MMOL/L (ref 5–18)
BUN SERPL-MCNC: 11 MG/DL (ref 8–22)
CALCIUM SERPL-MCNC: 9.6 MG/DL (ref 8.5–10.5)
CHLORIDE BLD-SCNC: 105 MMOL/L (ref 98–107)
CO2 SERPL-SCNC: 26 MMOL/L (ref 22–31)
CREAT SERPL-MCNC: 0.66 MG/DL (ref 0.6–1.1)
GFR SERPL CREATININE-BSD FRML MDRD: >60 ML/MIN/1.73M2
GLUCOSE BLD-MCNC: 82 MG/DL (ref 70–125)
POTASSIUM BLD-SCNC: 4.8 MMOL/L (ref 3.5–5)
SODIUM SERPL-SCNC: 141 MMOL/L (ref 136–145)

## 2020-09-17 ENCOUNTER — AMBULATORY - HEALTHEAST (OUTPATIENT)
Dept: CARDIOLOGY | Facility: CLINIC | Age: 60
End: 2020-09-17

## 2020-09-17 ENCOUNTER — RECORDS - HEALTHEAST (OUTPATIENT)
Dept: ADMINISTRATIVE | Facility: OTHER | Age: 60
End: 2020-09-17

## 2020-09-18 ENCOUNTER — OFFICE VISIT - HEALTHEAST (OUTPATIENT)
Dept: CARDIOLOGY | Facility: CLINIC | Age: 60
End: 2020-09-18

## 2020-09-18 DIAGNOSIS — I10 BENIGN ESSENTIAL HYPERTENSION: ICD-10-CM

## 2020-09-18 DIAGNOSIS — I49.3 PVC'S (PREMATURE VENTRICULAR CONTRACTIONS): ICD-10-CM

## 2020-09-18 DIAGNOSIS — Q21.3 TETRALOGY OF FALLOT: ICD-10-CM

## 2020-09-18 RX ORDER — HYDROCHLOROTHIAZIDE 12.5 MG/1
12.5 TABLET ORAL DAILY
Qty: 90 TABLET | Refills: 3 | Status: SHIPPED | OUTPATIENT
Start: 2020-09-18 | End: 2021-01-01

## 2020-09-18 RX ORDER — HYDROCODONE BITARTRATE AND ACETAMINOPHEN 5; 325 MG/1; MG/1
TABLET ORAL
Status: SHIPPED | COMMUNITY
Start: 2020-09-08

## 2020-09-18 ASSESSMENT — MIFFLIN-ST. JEOR: SCORE: 1623.23

## 2020-10-15 ENCOUNTER — HOSPITAL ENCOUNTER (OUTPATIENT)
Dept: CARDIOLOGY | Facility: CLINIC | Age: 60
Discharge: HOME | End: 2020-10-15
Attending: INTERNAL MEDICINE

## 2020-10-15 ENCOUNTER — COMMUNICATION - HEALTHEAST (OUTPATIENT)
Dept: CARDIOLOGY | Facility: CLINIC | Age: 60
End: 2020-10-15

## 2020-10-15 ENCOUNTER — AMBULATORY - HEALTHEAST (OUTPATIENT)
Dept: LAB | Facility: CLINIC | Age: 60
End: 2020-10-15

## 2020-10-15 DIAGNOSIS — Q21.3 TETRALOGY OF FALLOT: ICD-10-CM

## 2020-10-15 DIAGNOSIS — I10 BENIGN ESSENTIAL HYPERTENSION: ICD-10-CM

## 2020-10-15 DIAGNOSIS — I49.3 PVC'S (PREMATURE VENTRICULAR CONTRACTIONS): ICD-10-CM

## 2020-10-15 LAB
ANION GAP SERPL CALCULATED.3IONS-SCNC: 9 MMOL/L (ref 5–18)
AORTIC ROOT: 4 CM
AORTIC VALVE MEAN VELOCITY: 78.9 CM/S
ASCENDING AORTA: 3.3 CM
AV DIMENSIONLESS INDEX VTI: 1
AV MEAN GRADIENT: 3 MMHG
AV PEAK GRADIENT: 4.8 MMHG
AV VALVE AREA: 2.9 CM2
AV VELOCITY RATIO: 1
BSA FOR ECHO PROCEDURE: 2.19 M2
BUN SERPL-MCNC: 17 MG/DL (ref 8–22)
CALCIUM SERPL-MCNC: 9.4 MG/DL (ref 8.5–10.5)
CHLORIDE BLD-SCNC: 105 MMOL/L (ref 98–107)
CO2 SERPL-SCNC: 26 MMOL/L (ref 22–31)
CREAT SERPL-MCNC: 0.65 MG/DL (ref 0.6–1.1)
CV BLOOD PRESSURE: ABNORMAL MMHG
CV ECHO HEIGHT: 64 IN
CV ECHO WEIGHT: 234 LBS
DOP CALC AO PEAK VEL: 109 CM/S
DOP CALC AO VTI: 23.9 CM
DOP CALC LVOT AREA: 2.83 CM2
DOP CALC LVOT DIAMETER: 1.9 CM
DOP CALC LVOT PEAK VEL: 106 CM/S
DOP CALC LVOT STROKE VOLUME: 68.6 CM3
DOP CALC RVOT PEAK VEL: 194 CM/S
DOP CALC RVOT VTI: 45.1 CM
DOP CALCLVOT PEAK VEL VTI: 24.2 CM
EJECTION FRACTION: 57 % (ref 55–75)
FRACTIONAL SHORTENING: 36.5 % (ref 28–44)
GFR SERPL CREATININE-BSD FRML MDRD: >60 ML/MIN/1.73M2
GLUCOSE BLD-MCNC: 87 MG/DL (ref 70–125)
INTERVENTRICULAR SEPTUM IN END DIASTOLE: 1.15 CM (ref 0.6–0.9)
IVS/PW RATIO: 1.4
LA AREA 1: 20.6 CM2
LA AREA 2: 23.8 CM2
LEFT ATRIUM LENGTH: 4.86 CM
LEFT ATRIUM SIZE: 4.7 CM
LEFT ATRIUM TO AORTIC ROOT RATIO: 1.18 NO UNITS
LEFT ATRIUM VOLUME INDEX: 39.2 ML/M2
LEFT ATRIUM VOLUME: 85.7 ML
LEFT VENTRICLE CARDIAC INDEX: 2.3 L/MIN/M2
LEFT VENTRICLE CARDIAC OUTPUT: 5.1 L/MIN
LEFT VENTRICLE DIASTOLIC VOLUME INDEX: 69.4 CM3/M2 (ref 29–61)
LEFT VENTRICLE DIASTOLIC VOLUME: 152 CM3 (ref 46–106)
LEFT VENTRICLE HEART RATE: 75 BPM
LEFT VENTRICLE MASS INDEX: 79.8 G/M2
LEFT VENTRICLE SYSTOLIC VOLUME INDEX: 29.7 CM3/M2 (ref 8–24)
LEFT VENTRICLE SYSTOLIC VOLUME: 65 CM3 (ref 14–42)
LEFT VENTRICULAR INTERNAL DIMENSION IN DIASTOLE: 4.9 CM (ref 3.8–5.2)
LEFT VENTRICULAR INTERNAL DIMENSION IN SYSTOLE: 3.11 CM (ref 2.2–3.5)
LEFT VENTRICULAR MASS: 174.7 G
LEFT VENTRICULAR OUTFLOW TRACT MEAN GRADIENT: 2 MMHG
LEFT VENTRICULAR OUTFLOW TRACT MEAN VELOCITY: 63.6 CM/S
LEFT VENTRICULAR OUTFLOW TRACT PEAK GRADIENT: 4 MMHG
LEFT VENTRICULAR POSTERIOR WALL IN END DIASTOLE: 0.84 CM (ref 0.6–0.9)
LV STROKE VOLUME INDEX: 31.3 ML/M2
MAGNESIUM SERPL-MCNC: 2.1 MG/DL (ref 1.8–2.6)
MITRAL VALVE E/A RATIO: 1.1
MV AVERAGE E/E' RATIO: 7.9 CM/S
MV DECELERATION TIME: 134 MS
MV E'TISSUE VEL-LAT: 12.6 CM/S
MV E'TISSUE VEL-MED: 6.43 CM/S
MV LATERAL E/E' RATIO: 6
MV MEDIAL E/E' RATIO: 11.7
MV PEAK A VELOCITY: 66.7 CM/S
MV PEAK E VELOCITY: 75.2 CM/S
NUC REST DIASTOLIC VOLUME INDEX: 3744 LBS
NUC REST SYSTOLIC VOLUME INDEX: 64 IN
POTASSIUM BLD-SCNC: 4.5 MMOL/L (ref 3.5–5)
PR MAX PG: 3 MMHG
PR PEAK VELOCITY: 91.7 CM/S
PV MEAN GRADIENT: 20 MMHG
PV MEAN VELOCITY: 197 CM/S
PV PEAK GRADIENT: 40.4 MMHG
PV VELOCITY TIME INTERVAL: 76.4 CM
PV VMAX: 318 CM/S
RIGHT VENTRICULAR OUTFLOW PEAK GRADIENT: 15 MMHG
RIGHT VENTRICULAR OUTFLOW TRACT MEAN GRADIENT: 9 MMHG
RVOT MV: 141 CM/S
SODIUM SERPL-SCNC: 140 MMOL/L (ref 136–145)
TRICUSPID REGURGITATION PEAK PRESSURE GRADIENT: 35.8 MMHG
TRICUSPID VALVE ANULAR PLANE SYSTOLIC EXCURSION: 1.9 CM
TRICUSPID VALVE PEAK REGURGITANT VELOCITY: 299 CM/S

## 2020-10-15 ASSESSMENT — MIFFLIN-ST. JEOR: SCORE: 1621.42

## 2020-10-19 ENCOUNTER — COMMUNICATION - HEALTHEAST (OUTPATIENT)
Dept: CARDIOLOGY | Facility: CLINIC | Age: 60
End: 2020-10-19

## 2021-01-01 ENCOUNTER — HOSPITAL ENCOUNTER (OUTPATIENT)
Dept: CARDIOLOGY | Facility: CLINIC | Age: 61
Discharge: HOME | End: 2021-12-10
Attending: INTERNAL MEDICINE | Admitting: INTERNAL MEDICINE
Payer: COMMERCIAL

## 2021-01-01 ENCOUNTER — DOCUMENTATION ONLY (OUTPATIENT)
Dept: CARDIOLOGY | Facility: CLINIC | Age: 61
End: 2021-01-01
Payer: COMMERCIAL

## 2021-01-01 ENCOUNTER — HEALTH MAINTENANCE LETTER (OUTPATIENT)
Age: 61
End: 2021-01-01

## 2021-01-01 ENCOUNTER — TELEPHONE (OUTPATIENT)
Dept: CARDIOLOGY | Facility: CLINIC | Age: 61
End: 2021-01-01
Payer: COMMERCIAL

## 2021-01-01 ENCOUNTER — LAB REQUISITION (OUTPATIENT)
Dept: LAB | Facility: CLINIC | Age: 61
End: 2021-01-01

## 2021-01-01 ENCOUNTER — LAB (OUTPATIENT)
Dept: CARDIOLOGY | Facility: CLINIC | Age: 61
End: 2021-01-01
Payer: COMMERCIAL

## 2021-01-01 ENCOUNTER — OFFICE VISIT (OUTPATIENT)
Dept: CARDIOLOGY | Facility: CLINIC | Age: 61
End: 2021-01-01
Payer: COMMERCIAL

## 2021-01-01 VITALS
HEIGHT: 64 IN | SYSTOLIC BLOOD PRESSURE: 118 MMHG | DIASTOLIC BLOOD PRESSURE: 70 MMHG | HEART RATE: 72 BPM | BODY MASS INDEX: 44.39 KG/M2 | WEIGHT: 260 LBS | RESPIRATION RATE: 16 BRPM

## 2021-01-01 DIAGNOSIS — Q21.3 TETRALOGY OF FALLOT: Primary | ICD-10-CM

## 2021-01-01 DIAGNOSIS — Q21.3 TETRALOGY OF FALLOT: ICD-10-CM

## 2021-01-01 DIAGNOSIS — Z87.74 TETRALOGY OF FALLOT S/P REPAIR: ICD-10-CM

## 2021-01-01 DIAGNOSIS — I50.32 CHRONIC DIASTOLIC HEART FAILURE (H): ICD-10-CM

## 2021-01-01 DIAGNOSIS — I50.32 CHRONIC DIASTOLIC HEART FAILURE (H): Primary | ICD-10-CM

## 2021-01-01 DIAGNOSIS — E87.6 HYPOKALEMIA: ICD-10-CM

## 2021-01-01 DIAGNOSIS — E03.9 HYPOTHYROIDISM, UNSPECIFIED: ICD-10-CM

## 2021-01-01 LAB
ANION GAP SERPL CALCULATED.3IONS-SCNC: 9 MMOL/L (ref 5–18)
BNP SERPL-MCNC: 89 PG/ML (ref 0–93)
BUN SERPL-MCNC: 15 MG/DL (ref 8–22)
CALCIUM SERPL-MCNC: 9 MG/DL (ref 8.5–10.5)
CHLORIDE BLD-SCNC: 105 MMOL/L (ref 98–107)
CO2 SERPL-SCNC: 27 MMOL/L (ref 22–31)
CREAT SERPL-MCNC: 0.73 MG/DL (ref 0.6–1.1)
GFR SERPL CREATININE-BSD FRML MDRD: >90 ML/MIN/1.73M2
GLUCOSE BLD-MCNC: 83 MG/DL (ref 70–125)
POTASSIUM BLD-SCNC: 4.1 MMOL/L (ref 3.5–5)
SODIUM SERPL-SCNC: 141 MMOL/L (ref 136–145)
TSH SERPL DL<=0.005 MIU/L-ACNC: 2.33 UIU/ML (ref 0.3–5)

## 2021-01-01 PROCEDURE — 83880 ASSAY OF NATRIURETIC PEPTIDE: CPT

## 2021-01-01 PROCEDURE — 36415 COLL VENOUS BLD VENIPUNCTURE: CPT

## 2021-01-01 PROCEDURE — 80048 BASIC METABOLIC PNL TOTAL CA: CPT | Performed by: PHYSICIAN ASSISTANT

## 2021-01-01 PROCEDURE — C8929 TTE W OR WO FOL WCON,DOPPLER: HCPCS

## 2021-01-01 PROCEDURE — 999N000208 ECHOCARDIOGRAM COMPLETE

## 2021-01-01 PROCEDURE — 93306 TTE W/DOPPLER COMPLETE: CPT | Mod: 26 | Performed by: INTERNAL MEDICINE

## 2021-01-01 PROCEDURE — 255N000002 HC RX 255 OP 636: Performed by: INTERNAL MEDICINE

## 2021-01-01 PROCEDURE — 99214 OFFICE O/P EST MOD 30 MIN: CPT | Performed by: INTERNAL MEDICINE

## 2021-01-01 PROCEDURE — 84443 ASSAY THYROID STIM HORMONE: CPT | Performed by: FAMILY MEDICINE

## 2021-01-01 RX ORDER — TRIAMTERENE/HYDROCHLOROTHIAZID 37.5-25 MG
1 TABLET ORAL DAILY
COMMUNITY
Start: 2021-01-01

## 2021-01-01 RX ORDER — LEVOTHYROXINE SODIUM 112 UG/1
1 CAPSULE ORAL DAILY
COMMUNITY
Start: 2021-07-23

## 2021-01-01 RX ADMIN — PERFLUTREN 3 ML: 6.52 INJECTION, SUSPENSION INTRAVENOUS at 08:30

## 2021-01-01 ASSESSMENT — MIFFLIN-ST. JEOR: SCORE: 1734.35

## 2021-01-12 ENCOUNTER — COMMUNICATION - HEALTHEAST (OUTPATIENT)
Dept: CARDIOLOGY | Facility: CLINIC | Age: 61
End: 2021-01-12

## 2021-01-15 ENCOUNTER — RECORDS - HEALTHEAST (OUTPATIENT)
Dept: LAB | Facility: CLINIC | Age: 61
End: 2021-01-15

## 2021-01-15 LAB
ANION GAP SERPL CALCULATED.3IONS-SCNC: 10 MMOL/L (ref 5–18)
BUN SERPL-MCNC: 18 MG/DL (ref 8–22)
CALCIUM SERPL-MCNC: 9 MG/DL (ref 8.5–10.5)
CHLORIDE BLD-SCNC: 103 MMOL/L (ref 98–107)
CO2 SERPL-SCNC: 27 MMOL/L (ref 22–31)
CREAT SERPL-MCNC: 0.63 MG/DL (ref 0.6–1.1)
GFR SERPL CREATININE-BSD FRML MDRD: >60 ML/MIN/1.73M2
GLUCOSE BLD-MCNC: 87 MG/DL (ref 70–125)
POTASSIUM BLD-SCNC: 4.2 MMOL/L (ref 3.5–5)
SODIUM SERPL-SCNC: 140 MMOL/L (ref 136–145)

## 2021-01-16 LAB
SARS-COV-2 PCR COMMENT: NORMAL
SARS-COV-2 RNA SPEC QL NAA+PROBE: NEGATIVE
SARS-COV-2 VIRUS SPECIMEN SOURCE: NORMAL

## 2021-04-20 ENCOUNTER — RECORDS - HEALTHEAST (OUTPATIENT)
Dept: ADMINISTRATIVE | Facility: OTHER | Age: 61
End: 2021-04-20

## 2021-04-20 ENCOUNTER — RECORDS - HEALTHEAST (OUTPATIENT)
Dept: LAB | Facility: CLINIC | Age: 61
End: 2021-04-20

## 2021-04-20 ENCOUNTER — AMBULATORY - HEALTHEAST (OUTPATIENT)
Dept: CARDIOLOGY | Facility: CLINIC | Age: 61
End: 2021-04-20

## 2021-04-20 LAB
ANION GAP SERPL CALCULATED.3IONS-SCNC: 9 MMOL/L (ref 5–18)
BUN SERPL-MCNC: 12 MG/DL (ref 8–22)
CALCIUM SERPL-MCNC: 9.4 MG/DL (ref 8.5–10.5)
CHLORIDE BLD-SCNC: 104 MMOL/L (ref 98–107)
CO2 SERPL-SCNC: 27 MMOL/L (ref 22–31)
CREAT SERPL-MCNC: 0.69 MG/DL (ref 0.6–1.1)
GFR SERPL CREATININE-BSD FRML MDRD: >60 ML/MIN/1.73M2
GLUCOSE BLD-MCNC: 72 MG/DL (ref 70–125)
IRON SERPL-MCNC: 82 UG/DL (ref 42–175)
POTASSIUM BLD-SCNC: 4.5 MMOL/L (ref 3.5–5)
SODIUM SERPL-SCNC: 140 MMOL/L (ref 136–145)
T4 FREE SERPL-MCNC: 0.4 NG/DL (ref 0.7–1.8)
TSH SERPL DL<=0.005 MIU/L-ACNC: 2.05 UIU/ML (ref 0.3–5)
VIT B12 SERPL-MCNC: 578 PG/ML (ref 213–816)

## 2021-04-23 ENCOUNTER — OFFICE VISIT - HEALTHEAST (OUTPATIENT)
Dept: CARDIOLOGY | Facility: CLINIC | Age: 61
End: 2021-04-23

## 2021-04-23 DIAGNOSIS — I50.32 CHRONIC DIASTOLIC HEART FAILURE (H): ICD-10-CM

## 2021-04-23 DIAGNOSIS — E66.01 MORBID OBESITY (H): ICD-10-CM

## 2021-04-23 DIAGNOSIS — Z87.74 TETRALOGY OF FALLOT S/P REPAIR: ICD-10-CM

## 2021-04-23 DIAGNOSIS — E87.6 HYPOPOTASSEMIA: ICD-10-CM

## 2021-04-23 RX ORDER — LEVOTHYROXINE SODIUM 175 UG/1
175 TABLET ORAL DAILY
Status: SHIPPED | COMMUNITY
Start: 2021-04-21 | End: 2021-01-01 | Stop reason: DRUGHIGH

## 2021-04-23 ASSESSMENT — MIFFLIN-ST. JEOR: SCORE: 1685.83

## 2021-04-25 ENCOUNTER — COMMUNICATION - HEALTHEAST (OUTPATIENT)
Dept: CARDIOLOGY | Facility: CLINIC | Age: 61
End: 2021-04-25

## 2021-05-26 ENCOUNTER — RECORDS - HEALTHEAST (OUTPATIENT)
Dept: ADMINISTRATIVE | Facility: CLINIC | Age: 61
End: 2021-05-26

## 2021-05-26 ENCOUNTER — RECORDS - HEALTHEAST (OUTPATIENT)
Dept: LAB | Facility: CLINIC | Age: 61
End: 2021-05-26

## 2021-05-26 LAB — TSH SERPL DL<=0.005 MIU/L-ACNC: 0.1 UIU/ML (ref 0.3–5)

## 2021-05-27 ENCOUNTER — RECORDS - HEALTHEAST (OUTPATIENT)
Dept: ADMINISTRATIVE | Facility: CLINIC | Age: 61
End: 2021-05-27

## 2021-05-28 ENCOUNTER — RECORDS - HEALTHEAST (OUTPATIENT)
Dept: LAB | Facility: CLINIC | Age: 61
End: 2021-05-28

## 2021-05-29 LAB — T4 FREE SERPL-MCNC: 1.7 NG/DL (ref 0.7–1.8)

## 2021-05-30 NOTE — TELEPHONE ENCOUNTER
----- Message -----  From: Taun Tate MD  Sent: 7/30/2019  12:54 PM  To: Josefina Hernandez RN, Johnny Yancey MD, *    Agree with your management and plan for MRI.  Her  PVCs burden is low, PVC morphology suggestive of RV outflow tract-related site of origin.   If she has PVCs-related symptoms, initiation of low dose of beta-blockers is reasonable.  I am happy to see her in  Clinic to  Discuss significance and management of PVCs after CMR is completed.       Thank you,     Tuan         ----- Message -----  From: Johnny Yancey MD  Sent: 7/26/2019   5:30 PM  To: Josefina Hernandez RN, Fela Flores RN, *    Walker Dickerson   Is a patient that I follow with tetralogy of flow.  I have not seen her in a while and she came back to see me recently.  She is doing well although we are going to get an MRI to relook at the prosthetic pulmonic valve and RV function.  She can see on a Holter monitor she had relatively frequent premature ventricular complexes with one 4 beat run of slow nonsustained ventricular tachycardia.  LV function is normal.  I was hoping for your input and recommendations as it relates to EP follow-up.  But there be any benefit for you visiting with her?  He has had no syncope or near syncope and remains quite active.  Thank you for reviewing this.  [place]

## 2021-05-30 NOTE — TELEPHONE ENCOUNTER
MR Myocardium ordered.  Valium called to pharm.  Pt notified via [employer].  -[facility]

## 2021-05-30 NOTE — PATIENT INSTRUCTIONS - HE
Nice to visit with you today.  We are going to plan a follow-up echocardiogram and Holter monitor and will call with the results.  Please call me with any specific questions or concerns.  Please let me know if you have increased shortness of breath, palpitations or dizziness.  My nurse is Josefina and her number is [phone]

## 2021-05-30 NOTE — PROGRESS NOTES
[facility] Progress Note    Assessment:  1.  History of tetralogy of Fallot with a 2 prior surgeries.  One as a child and wanted age 50 as outlined below.  She has been feeling well from a cardiovascular standpoint.  She specifically denies chest pain, shortness of breath, dizziness or significant palpitation.  We are going to plan echocardiography to reevaluate right ventricular function and valvular function as well as Holter monitor.  Holter monitor was requested when she saw Dr. Salazar 2 years ago to follow-up on the premature ventricular complexes.    2.  History of hypokalemia.  Potassium appears to be under good control.  No changes in her current combination of medications.,  Spironolactone and hydrochlorothiazide.      Plan:    1. PVC's (premature ventricular contractions)  ECG Clinic - Today    Holter Monitor   2. Tetralogy of Fallot  Echo Complete    Holter Monitor         An After Visit Summary was printed and given to the patient.    Subjective:    Alma Rosa Mak is a 58 y.o. female who returned for a planned  follow up visit.  She has a history of tetralogy of fallot and had initial operation at age 7 at [facility].  She had additional procedure at the [facility] in 2010 including tricuspid valve repair, closure of VSD, right-sided Maze procedure and porcine pulmonic valve placed at that time.  She has been overdue for follow-up.  She has seen Dr. Salazar most recently June 2017 with increased premature ventricular complexes and salvos of nonsustained ventricular tachycardia.  He recommended a repeat Holter monitor 1 year from that visit.    She reports in the interim she is been feeling well.  She denies shortness of breath.  She denies palpitation.  She denies dizziness or lightheadedness.  She denies syncope or near syncope.  She states she is been walking regularly and has lost 20 pounds.  Recent laboratory studies from her primary care physician is reviewed and  potassium has remained stable.  She states that it was difficult for her to recover from a few years ago but has made progress in the interim.    Review of Systems:   General: Weight Loss, Night Sweats  Eyes: WNL  Ears/Nose/Throat: WNL  Lungs: WNL  Heart: Leg Swelling  Stomach: WNL  Bladder: WNL  Muscle/Joints: WNL  Skin: WNL  Nervous System: WNL  Mental Health: WNL     Blood: WNL      Problem List:    Patient Active Problem List   Diagnosis     Palpitations     Hypokalemia     Atrial Premature Complex     Premature Ventricular Contractions     Tetralogy Of Fallot     Status post total left knee replacement using cement     Essential hypertension     Tetralogy of Fallot s/p repair       Social History     Socioeconomic History     Marital status:      Spouse name: Not on file     Number of children: Not on file     Years of education: Not on file     Highest education level: Not on file   Occupational History     Not on file   Social Needs     Financial resource strain: Not on file     Food insecurity:     Worry: Not on file     Inability: Not on file     Transportation needs:     Medical: Not on file     Non-medical: Not on file   Tobacco Use     Smoking status: Never Smoker     Smokeless tobacco: Never Used   Substance and Sexual Activity     Alcohol use: Yes     Comment: rare     Drug use: No     Sexual activity: Not on file   Lifestyle     Physical activity:     Days per week: Not on file     Minutes per session: Not on file     Stress: Not on file   Relationships     Social connections:     Talks on phone: Not on file     Gets together: Not on file     Attends Mosque service: Not on file     Active member of club or organization: Not on file     Attends meetings of clubs or organizations: Not on file     Relationship status: Not on file     Intimate partner violence:     Fear of current or ex partner: Not on file     Emotionally abused: Not on file     Physically abused: Not on file     Forced sexual  activity: Not on file   Other Topics Concern     Not on file   Social History Narrative     Not on file       Family History   Problem Relation Age of Onset     Heart disease Father      Mental illness Sister        Current Outpatient Medications   Medication Sig Dispense Refill     acetaminophen (TYLENOL) 500 MG tablet Take 500-1,000 mg by mouth every 6 (six) hours as needed for pain.       albuterol (PROVENTIL HFA;VENTOLIN HFA) 90 mcg/actuation inhaler Inhale 2 puffs every 4 (four) hours as needed for wheezing or shortness of breath.        amoxicillin (AMOXIL) 500 MG capsule Take 2,000 mg by mouth as needed (prior to dental procedures).       CALCIUM CARBONATE/VITAMIN D3 (CALCIUM 600 + D,3, ORAL) Take 1 tablet by mouth 2 (two) times a day.       cholecalciferol, vitamin D3, 1,000 unit tablet Take 1,000 Units by mouth every evening.        cyanocobalamin 500 MCG tablet Take 500 mcg by mouth daily.        cyclobenzaprine (FLEXERIL) 10 MG tablet Take 10 mg by mouth 3 (three) times a day as needed for muscle spasms.       DULoxetine (CYMBALTA) 60 MG capsule Take 60 mg by mouth daily.       HYDROcodone-acetaminophen 5-325 mg per tablet TAKE 1-2 TABLETS BY MOUTH EVERY 4 HOURS AS NEEDED FOR PAIN. **APPT NEEDED FOR FURTHER REFILLS**  0     liothyronine (CYTOMEL) 25 MCG tablet Take 25 mcg by mouth 2 (two) times a day.       LORazepam (ATIVAN) 1 MG tablet Take ativan for MRI scan; take  2 mg 2 tablet 0     losartan (COZAAR) 50 MG tablet Take 50 mg by mouth every evening.       multivitamin therapeutic (THERAGRAN) tablet Take 1 tablet by mouth 2 (two) times a day.       OMEGA-3S/DHA/EPA/FISH OIL (OMEGA 3 ORAL) Take 1 capsule by mouth daily.       omeprazole (PRILOSEC) 20 MG capsule Take 20 mg by mouth daily.       potassium chloride SA (K-DUR,KLOR-CON) 20 MEQ tablet Take 20 mEq by mouth 4 (four) times a day.       spironolactone (ALDACTONE) 25 MG tablet TAKE 1 TABLET (25 MG TOTAL) BY MOUTH DAILY. 90 tablet 2      "triamterene-hydrochlorothiazide (MAXZIDE-25) 37.5-25 mg per tablet Take 1 tablet by mouth daily. changed per cardiologist-Tina       buPROPion (WELLBUTRIN SR) 150 MG 12 hr tablet Take 150 mg by mouth 2 (two) times a day.       isometheptene-acetaminophen-dichloralphenazone (MIDRIN) -325 mg per capsule Take 1-2 capsules by mouth 4 (four) times a day as needed for migraine.       sertraline (ZOLOFT) 100 MG tablet Take 150 mg by mouth every evening. (1.5 x 100 mg tabs = 150 mg dose)       No current facility-administered medications for this visit.        Objective:     /78 (Patient Site: Right Arm, Patient Position: Sitting, Cuff Size: Adult Large)   Pulse 76   Resp 16   Ht 5' 3.78\" (1.62 m)   Wt (!) 252 lb (114.3 kg)   LMP 06/27/2016   BMI 43.55 kg/m    (!) 252 lb (114.3 kg)       Physical Exam:    GENERAL APPEARANCE: alert, no apparent distress  HEENT: no scleral icterus or xanthelasma  NECK: jugular venous pressure is not elevated on today's examination  CHEST: symmetric, the lungs are clear to auscultation chest wall scar well-healed  CARDIOVASCULAR: regular rhythm with 3/6 systolic murmur no diastolic murmur noted no carotid bruits  Abdomen: No Organomegaly, masses, bruits, or tenderness. Bowels sounds are present      EXTREMITIES: no cyanosis, clubbing.  Mild edema with mild venous stasis changes.    Cardiac Testing:  EXTRACARDIAC STRUCTURES:  Please refer to radiology interpretation for extracardiac structures     IMPRESSIONS:    1.  Upper normal size of left ventricle, normal wall thickness and low normal function. The quantified left ventricular ejection fraction is 52%. No myocardial scar is identified.    2.  No indication of left ventricular septum defect.  The ratio of Qp/Qs did not indicate intracardiac shunt.  3.  Moderately enlarged right ventricular size with mildly reduced right ventricular function.  4.  Moderately enlarged both atria.  5.  Mild to moderate mitral and tricuspid " valve regurgitation.  It is noticed tricuspid annulus ring.  6.  Bioprosthetic pulmonic valve in the position.  The peak of velocity of pulmonic valve is 1.6 M/sec with mild regurgitation. The calculated regurgitant fraction is 15%.  7.  Mild stenosis in distal main pulmonary artery and also mild stenosis in proximal right and left pulmonary arteries.  It is noticed mild dilation of both pulmonary artery branches.   IMPRESSION:  1.  A 24-hour Holter monitor was applied 09/26/2016 with date of interpretation  09/28/2016.  2.  Baseline transmission shows sinus rhythm with a bundle branch block pattern.   The average heart rate is 86 beats per minute and ranges between 66 and 126 beats  per minute.  3.  Ventricular ectopy is present includes 7826 premature ventricular contractions  (PVCs), mainly of a single type.  This is 6.4% of total heartbeats.  Salvos of  nonsustained ventricular tachycardia are seen.  At 12:06 a.m., a 5 second episode of  nonsustained ventricular tachycardia seen with rates between 130 to 140 beats per  minute.  4.  Another chicho of nonsustained ventricular tachycardia occurs at 5:36 p.m. and  lasts for about 10 seconds at a rate of 130 beats per minute, this was asymptomatic.   Frequent couplets are also noted and frequent triplets are seen.  All the coupling  intervals are quite long and no short interval salvos, polymorphic salvos or R on T type  beats are noted.  5.  Infrequent noncomplex supraventricular ectopy consisting of 265 premature atrial  contractions (PACs).  No atrial tachycardia.  No atrial fibrillation.  6.  Patient activity diary is completed, but no symptoms noted.     DISCUSSION:  Frequent and somewhat complex ventricular ectopy is seen.  Although  salvos are present, the coupling interval rate is fairly slow and these appear to be  asymptomatic.        VADIM GIANG 09/28/2016 11:45:02  T 09/28/2016 13:05:25  R 09/28/2016 13:05:25    Procedure Date  Date:  06/30/2016 Start: 10:11 AM     Study Location: [facility]  Technical Quality: Limited visualization due to body habitus.     Patient Status: Routine     Contrast Medium: Definity. Used - 4 mland Wasted - 6 ml     Height: 64 inches Weight: 266.01 pounds BSA: 2.21 m^2 BMI: 45.66 kg/m^2     HR: 84 bpm     Indications  Congestive heart failure, diastolic dysfunction and congenital heart  disease.      Conclusions      Summary   1. Normal left ventricular size and systolic performance. The ejection   fraction is estimated to be 55%.   2. There is abnormal septal motion consistent with previous cardiac   surgery.   3. There is a documented bio-prosthetic valve. Spectral Doppler metrics   are within normal limits with peak velocity of 2.3 m/sec and mean gradient   of 10 mmHg.   4. The right ventricle is borderline enlarged. Right ventricular systolic   performance is normal.   5. The aortic root is mildly enlarged.      When compared to the prior real-time echocardiogram dated 15 July 2014,   there has been no major interval change.     ECG demonstrates normal sinus rhythm, right bundle branch block.  Of October 2016 QT intervals decreased  Lab Results:    Lab Results   Component Value Date     06/17/2019    K 4.2 06/17/2019     06/17/2019    CO2 29 06/17/2019    BUN 9 06/17/2019    CREATININE 0.58 (L) 06/17/2019    CALCIUM 9.0 06/17/2019     Lab Results   Component Value Date    CHOL 157 06/17/2019    TRIG 95 06/17/2019    HDL 57 06/17/2019     BNP (pg/mL)   Date Value   09/11/2018 52   05/23/2016 88 (H)   08/17/2011 54     Creatinine (mg/dL)   Date Value   06/17/2019 0.58 (L)   12/07/2018 0.77   09/11/2018 0.67   08/24/2017 0.67     LDL Calculated (mg/dL)   Date Value   06/17/2019 81   12/07/2018 76   08/24/2017 77     Lab Results   Component Value Date    WBC 7.6 12/07/2018    WBC 11.7 (H) 07/22/2014    HGB 11.1 (L) 12/07/2018    HCT 36.5 12/07/2018    MCV 76 (L) 12/07/2018     12/07/2018                This note has been dictated using voice recognition software. Any grammatical or context distortions are unintentional and inherent to the software.      Johnny Yancey M.D., F.A.C.C.  [facility]  [phone]

## 2021-05-30 NOTE — TELEPHONE ENCOUNTER
----- Message from Johnny Yancey MD sent at 7/26/2019  5:27 PM CDT -----  I called her after reviewing her echo and Holter report.  Can we arrange a cardiac MRI to further look at RV function, and pulmonic valve function.  She has had MRIs before and we have given her 5 mg of Valium before the procedure.  Thank you mdg

## 2021-05-31 ENCOUNTER — RECORDS - HEALTHEAST (OUTPATIENT)
Dept: ADMINISTRATIVE | Facility: CLINIC | Age: 61
End: 2021-05-31

## 2021-05-31 VITALS — BODY MASS INDEX: 42.05 KG/M2 | HEIGHT: 64 IN | WEIGHT: 246.3 LBS

## 2021-05-31 NOTE — TELEPHONE ENCOUNTER
Pt states last MRI she had she was given two of the Valium tablets, and asks why we only sent one, can she have another.    Informed pt - last MRI she was prescribed two 1 mg tablets of lorazepam.  Valium is a different medication.  Pt verbalized understanding and had no questions.  -[place]

## 2021-05-31 NOTE — TELEPHONE ENCOUNTER
----- Message from Kalina oLuise sent at 8/7/2019 11:51 AM CDT -----  Contact: Pt  General phone call:    Caller: Pt  Primary cardiologist: Naye  Detailed reason for call: Pt has medication questions prior to MRI. Please call back.  Best phone number: [phone]  Best time to contact: Any  Ok to leave a detailed message? Yes  Device? No    Additional Info:

## 2021-06-02 ENCOUNTER — RECORDS - HEALTHEAST (OUTPATIENT)
Dept: ADMINISTRATIVE | Facility: CLINIC | Age: 61
End: 2021-06-02

## 2021-06-02 NOTE — TELEPHONE ENCOUNTER
Should be ok to proceed with close periop monitoring on tele, presuming no change in how she is doing since our visit  gaston

## 2021-06-02 NOTE — ANESTHESIA CARE TRANSFER NOTE
Last vitals:   Vitals:    10/29/19 1510   BP: 143/62   Pulse: 90   Resp: 16   Temp: 36.7  C (98  F)   SpO2: 100%     Patient's level of consciousness is drowsy  Spontaneous respirations: yes  Maintains airway independently: yes  Dentition unchanged: yes  Oropharynx: oropharynx clear of all foreign objects    QCDR Measures:  ASA# 20 - Surgical Safety Checklist: WHO surgical safety checklist completed prior to induction    PQRS# 430 - Adult PONV Prevention: 4558F - Pt received => 2 anti-emetic agents (different classes) preop & intraop  ASA# 8 - Peds PONV Prevention: NA - Not pediatric patient, not GA or 2 or more risk factors NOT present  PQRS# 424 - Leisa-op Temp Management: 4559F - At least one body temp DOCUMENTED => 35.5C or 95.9F within required timeframe  PQRS# 426 - PACU Transfer Protocol: - Transfer of care checklist used  ASA# 14 - Acute Post-op Pain: ASA14B - Patient did NOT experience pain >= 7 out of 10

## 2021-06-02 NOTE — ANESTHESIA PROCEDURE NOTES
Peripheral Block    Patient location during procedure: pre-op  Start time: 10/29/2019 12:41 PM  End time: 10/29/2019 12:44 PM  post-op analgesia per surgeon order as noted in medical record  Staffing:  Performing  Anesthesiologist: Homar Seay MD  Preanesthetic Checklist  Completed: patient identified, site marked, risks, benefits, and alternatives discussed, timeout performed, consent obtained, airway assessed, oxygen available, suction available, emergency drugs available and hand hygiene performed  Peripheral Block  Block type: saphenous, adductor canal block  Prep: ChloraPrep  Patient position: supine  Patient monitoring: cardiac monitor, continuous pulse oximetry, heart rate and blood pressure  Laterality: left  Injection technique: ultrasound guided    Ultrasound used to visualize needle placement in proximity to nerve being blocked: yes   Permanent ultrasound image captured for medical record  Sterile gel and probe cover used for ultrasound.    Needle  Needle type: Stimuplex   Needle gauge: 21 G  Needle length: 4 in  no peripheral nerve catheter placed  Assessment  Injection assessment: no difficulty with injection, negative aspiration for heme, no paresthesia on injection and incremental injection

## 2021-06-02 NOTE — ANESTHESIA PREPROCEDURE EVALUATION
Anesthesia Evaluation      History of anesthetic complications     Airway   Mallampati: II  Neck ROM: full   Pulmonary - normal exam   (+) sleep apnea on CPAP, ,                          Cardiovascular - normal exam  (+) hypertension, CHF, ,      Neuro/Psych - negative ROS     Endo/Other    (+) obesity,      GI/Hepatic/Renal    (+) GERD well controlled,             Dental - normal exam                        Anesthesia Plan  Planned anesthetic: spinal and peripheral nerve block    ASA 3     Anesthetic plan and risks discussed with: patient    Post-op plan: routine recovery      Plan spinal anesthetic for above procedure.  Plan preoperative placement of single shot adductor canal block for postoperative pain control per surgeon request.

## 2021-06-02 NOTE — ANESTHESIA POSTPROCEDURE EVALUATION
Patient: Alma Rosa Mak  REVISION TOTAL KNEE ARTHROPLASTY, LEFT TIBIAL REVISON  Anesthesia type: No value filed.    Patient location: PACU  Last vitals:   Vitals Value Taken Time   /69 10/29/2019  3:40 PM   Temp 36.7  C (98  F) 10/29/2019  3:30 PM   Pulse 85 10/29/2019  3:47 PM   Resp 27 10/29/2019  3:47 PM   SpO2 100 % 10/29/2019  3:47 PM   Vitals shown include unvalidated device data.  Post vital signs: stable  Level of consciousness: awake and responds to simple questions  Post-anesthesia pain: pain controlled  Post-anesthesia nausea and vomiting: no  Pulmonary: unassisted, return to baseline  Cardiovascular: stable and blood pressure at baseline  Hydration: adequate  Anesthetic events: no    QCDR Measures:  ASA# 11 - Leisa-op Cardiac Arrest: ASA11B - Patient did NOT experience unanticipated cardiac arrest  ASA# 12 - Leisa-op Mortality Rate: ASA12B - Patient did NOT die  ASA# 13 - PACU Re-Intubation Rate: NA - No ETT / LMA used for case  ASA# 10 - Composite Anes Safety: ASA10A - No serious adverse event    Additional Notes:

## 2021-06-02 NOTE — TELEPHONE ENCOUNTER
Pt having surgery on her knee 10/29 to fix loose hardware.  PCP wants to know if pt needs cardiac clearance prior, or if based on MRI and echo results pt can do without.    Dr Yancey - does pt need cardiac clearance?  -[place]

## 2021-06-02 NOTE — ANESTHESIA PROCEDURE NOTES
Spinal Block    Patient location during procedure: OR  Start time: 10/29/2019 12:57 PM  End time: 10/29/2019 1:01 PM  Reason for block: primary anesthetic    Staffing:  Performing  Anesthesiologist: Homar Seay MD    Preanesthetic Checklist  Completed: patient identified, risks, benefits, and alternatives discussed, timeout performed, consent obtained, airway assessed, oxygen available, suction available, emergency drugs available and hand hygiene performed  Spinal Block  Patient position: sitting  Prep: ChloraPrep  Patient monitoring: heart rate, cardiac monitor, continuous pulse ox and blood pressure  Approach: midline  Location: L2-3  Injection technique: single-shot  Needle type: pencil-tip   Needle gauge: 24 G

## 2021-06-02 NOTE — TELEPHONE ENCOUNTER
----- Message from Kalina Dani sent at 10/11/2019  9:52 AM CDT -----  Contact: PT  General phone call:    Caller: Pt  Primary cardiologist: Naye  Detailed reason for call: PT is scheduled for surgery through [facility] in [place]. She needs a letter faxed to [facility] at [phone]. Also she needs her MRI and echo results faxed to them.   Best phone number: [phone]  Best time to contact: Any  Ok to leave a detailed message? Yes  Device? No    Additional Info:

## 2021-06-02 NOTE — TELEPHONE ENCOUNTER
Recommendations relayed to pt.  Pt verbalized understanding and asked for letter to be sent to PCP.  Letter sent.  -orlando

## 2021-06-03 VITALS — HEIGHT: 64 IN | WEIGHT: 250 LBS | BODY MASS INDEX: 42.68 KG/M2

## 2021-06-03 VITALS — HEIGHT: 64 IN | BODY MASS INDEX: 43.02 KG/M2 | WEIGHT: 252 LBS

## 2021-06-03 VITALS — WEIGHT: 252 LBS | BODY MASS INDEX: 43.02 KG/M2 | HEIGHT: 64 IN

## 2021-06-04 VITALS
SYSTOLIC BLOOD PRESSURE: 118 MMHG | WEIGHT: 249 LBS | HEART RATE: 80 BPM | BODY MASS INDEX: 42.51 KG/M2 | RESPIRATION RATE: 16 BRPM | DIASTOLIC BLOOD PRESSURE: 80 MMHG | HEIGHT: 64 IN

## 2021-06-04 VITALS
BODY MASS INDEX: 40.34 KG/M2 | SYSTOLIC BLOOD PRESSURE: 114 MMHG | HEART RATE: 90 BPM | DIASTOLIC BLOOD PRESSURE: 69 MMHG | WEIGHT: 235 LBS

## 2021-06-04 VITALS — BODY MASS INDEX: 42.68 KG/M2 | HEIGHT: 64 IN | WEIGHT: 250 LBS

## 2021-06-05 VITALS
HEIGHT: 64 IN | DIASTOLIC BLOOD PRESSURE: 70 MMHG | RESPIRATION RATE: 16 BRPM | HEART RATE: 64 BPM | SYSTOLIC BLOOD PRESSURE: 124 MMHG | BODY MASS INDEX: 42.37 KG/M2 | WEIGHT: 248.2 LBS

## 2021-06-05 VITALS
SYSTOLIC BLOOD PRESSURE: 130 MMHG | HEART RATE: 68 BPM | WEIGHT: 234.4 LBS | RESPIRATION RATE: 16 BRPM | HEIGHT: 64 IN | DIASTOLIC BLOOD PRESSURE: 82 MMHG | BODY MASS INDEX: 40.02 KG/M2

## 2021-06-05 VITALS — BODY MASS INDEX: 39.95 KG/M2 | WEIGHT: 234 LBS | HEIGHT: 64 IN

## 2021-06-05 NOTE — TELEPHONE ENCOUNTER
Dr Yancey - pt scheduled for another knee surgery in February, and is asking if you need to see her before then or if you will approve without seeing her?  Pt is scheduled 3/3 with you.  -[facility]      ----- Message from Carolin aCntu sent at 1/29/2020  9:49 AM CST -----  General phone call:  PATIENT NEEDS KNEE SURGERY END OF FEB, HOWEVER DR YANCEY DOES NOT HAVE ANY FU APPOINTMENTS AVAILABLE UNTIL MARCH, PLEASE CALL, AS SHE WANTS TO KNOW IF HE WILL APPROVE WITHOUT SEEING HER.  Caller: PATIENT  Primary cardiologist: DR YANCEY  Detailed reason for call: SEE ABOVE  New or active symptoms? NO  Best phone number: [phone]  Best time to contact: ANY TIME  Ok to leave a detailed message? YES  Device? NO    Additional Info:

## 2021-06-05 NOTE — TELEPHONE ENCOUNTER
----- Message from Carolin Cantu sent at 1/31/2020 11:58 AM CST -----  General phone call:  PATIENT HAVING KNEE REPLACEMENT SURGERY ON 02-. CAN YOU APPROVE SURGERY WITHOUT BEING SEEN?  PLEASE CALL  Caller: PATIENT  Primary cardiologist: DR JAMA  Detailed reason for call: SEE ABOVE  New or active symptoms? NO  Best phone number: [phone]  Best time to contact: ANY TIME  Ok to leave a detailed message? YES  Device? NO    Additional Info:

## 2021-06-06 NOTE — ANESTHESIA CARE TRANSFER NOTE
Last vitals:   Vitals:    02/27/20 0911   BP: 157/70   Pulse: 91   Resp: 16   Temp: 36.3  C (97.4  F)   SpO2: 100%     Patient's level of consciousness is drowsy  Spontaneous respirations: yes  Maintains airway independently: yes  Dentition unchanged: yes  Oropharynx: oropharynx clear of all foreign objects    QCDR Measures:  ASA# 20 - Surgical Safety Checklist: WHO surgical safety checklist completed prior to induction    PQRS# 430 - Adult PONV Prevention: 4558F - Pt received => 2 anti-emetic agents (different classes) preop & intraop  ASA# 8 - Peds PONV Prevention: NA - Not pediatric patient, not GA or 2 or more risk factors NOT present  PQRS# 424 - Leisa-op Temp Management: 4559F - At least one body temp DOCUMENTED => 35.5C or 95.9F within required timeframe  PQRS# 426 - PACU Transfer Protocol: - Transfer of care checklist used  ASA# 14 - Acute Post-op Pain: ASA14B - Patient did NOT experience pain >= 7 out of 10

## 2021-06-06 NOTE — ANESTHESIA PROCEDURE NOTES
Peripheral Block    Patient location during procedure: pre-op  Start time: 2/27/2020 7:00 AM  End time: 2/27/2020 7:07 AM  post-op analgesia per surgeon order as noted in medical record  Staffing:  Performing  Anesthesiologist: Shahzad Beltran MD  Preanesthetic Checklist  Completed: patient identified, site marked, risks, benefits, and alternatives discussed, timeout performed, consent obtained, at patient's request, airway assessed, oxygen available, suction available, emergency drugs available and hand hygiene performed  Peripheral Block  Block type: saphenous, adductor canal block  Prep: ChloraPrep  Patient position: supine  Patient monitoring: continuous pulse oximetry, heart rate, blood pressure and cardiac monitor  Injection technique: ultrasound guided    Ultrasound used to visualize needle placement in proximity to nerve being blocked: yes       Permanent ultrasound image captured for medical record  Sterile gel and probe cover used for ultrasound.  Needle  Needle type: Stimuplex   Needle gauge: 22 G  Needle length: 4 in    Assessment  Injection assessment: negative aspiration for heme, no paresthesia on injection, incremental injection and no difficulty with injection

## 2021-06-06 NOTE — ANESTHESIA PREPROCEDURE EVALUATION
Anesthesia Evaluation        Airway   Mallampati: II  Neck ROM: full   Pulmonary - normal exam   (+) sleep apnea,                          Cardiovascular - normal exam  (+) hypertension, valvular problems/murmurs, CHF, ,      Neuro/Psych      Endo/Other    (+) obesity,      GI/Hepatic/Renal    (+) GERD,             Dental - normal exam                        Anesthesia Plan  Planned anesthetic: spinal and peripheral nerve block    ASA 3     Anesthetic plan and risks discussed with: patient    Post-op plan: routine recovery

## 2021-06-06 NOTE — TELEPHONE ENCOUNTER
Recommendations relayed to pt.  Pt agreeable to seeing another provider in [facility].  Call transferred to scheduling.  -[facility]

## 2021-06-06 NOTE — ANESTHESIA POSTPROCEDURE EVALUATION
Patient: Alma Rosa Mak  Procedure(s):  RIGHT TOTAL KNEE ARTHROPLASTY (Right)  Anesthesia type: spinal    Patient location: PACU  Last vitals:   Vitals Value Taken Time   /83 2/27/2020 12:10 PM   Temp 36.3  C (97.4  F) 2/27/2020 12:10 PM   Pulse 90 2/27/2020 12:10 PM   Resp 16 2/27/2020 12:10 PM   SpO2 96 % 2/27/2020 12:10 PM     Post vital signs: stable  Level of consciousness: awake and responds to simple questions  Post-anesthesia pain: pain controlled  Post-anesthesia nausea and vomiting: no  Pulmonary: unassisted, return to baseline  Cardiovascular: stable and blood pressure at baseline  Hydration: adequate  Anesthetic events: no    QCDR Measures:  ASA# 11 - Leisa-op Cardiac Arrest: ASA11B - Patient did NOT experience unanticipated cardiac arrest  ASA# 12 - Leisa-op Mortality Rate: ASA12B - Patient did NOT die  ASA# 13 - PACU Re-Intubation Rate: ASA13B - Patient did NOT require a new airway mgmt  ASA# 10 - Composite Anes Safety: ASA10A - No serious adverse event    Additional Notes:

## 2021-06-06 NOTE — ANESTHESIA PROCEDURE NOTES
Spinal Block    Patient location during procedure: OR  Start time: 2/27/2020 7:30 AM  End time: 2/27/2020 7:35 AM  Reason for block: primary anesthetic    Staffing:  Performing  Anesthesiologist: Shahzad Beltran MD    Preanesthetic Checklist  Completed: patient identified, risks, benefits, and alternatives discussed, timeout performed, consent obtained, at patient's request, airway assessed, oxygen available, suction available, emergency drugs available and hand hygiene performed  Spinal Block  Patient position: sitting  Prep: ChloraPrep  Patient monitoring: heart rate, cardiac monitor, continuous pulse ox and blood pressure  Approach: midline  Location: L3-4  Injection technique: single-shot  Needle type: pencil-tip   Needle gauge: 24 G    Assessment  Sensory level: T10

## 2021-06-06 NOTE — PATIENT INSTRUCTIONS - HE
Alma Rosa Mak,    It was a pleasure to see you today at the [facility].     My recommendations after this visit include:    Stable from cardiac standpoint to proceed with a knee replacement surgery.    REJI Nieto MD, FACC, [facility]

## 2021-06-07 NOTE — PROGRESS NOTES
Review of Systems - History obtained from the patient  General ROS: positive for  - weight loss  Psychological ROS: positive for - anxiety and depression  Ophthalmic ROS: negative  ENT ROS: negative  Hematological and Lymphatic ROS: negative  Respiratory ROS: negative  Cardiovascular ROS: negative  Gastrointestinal ROS: positive for - heartburn  Genito-Urinary ROS: negative  Musculoskeletal ROS: positive for - joint pain and muscle pain  Neurological ROS: negative  Dermatological ROS: positive for poor wound healing

## 2021-06-07 NOTE — PATIENT INSTRUCTIONS - HE
It was nice to visit with you by video today.  I am pleased to hear that you are feeling well.  Please let me know if you have any symptoms of shortness of breath, dizziness, palpitations or leg swelling.  We talked about having a repeat chemistry profile and magnesium with your upcoming blood work with your primary care provider.  Please make sure the results are forwarded to me and call or write if you need a formal request for these laboratory studies.  My nurse is Josefina and her number is [phone] or you can write to me on my chart.

## 2021-06-11 NOTE — PATIENT INSTRUCTIONS - HE
We are going to resume the HCTZ 12.5mg and let me know about your leg swelling.We are going to plan a repeat chemistry profile in a week and schedule an echocardiogram and 24 holter.Please write to me on my chart or call my nurse Josefina at [phone],Update me with how the swelling is doing back on the hctz.

## 2021-06-11 NOTE — PROGRESS NOTES
[facility] Note    Assessment:  1. Congenital heart disease         Palliative operation at age 7 at [facility] for tetralogy of fallot       Definitive repair at [facility] 2010-age 50; this included tricuspid valve repair, closure of VSD, right-sided maze, and porcine pulmonary valve  2. PVCs include frequent singular PVCs and salvos of nonsustained ventricular tachycardia at rates less than 130 beats per minute       Holter monitor August 12 2014 showed 3614 PVCs       Holter monitor September 26 2016 showed 7826 PVCs and salvos of nonsustained ventricular tachycardia, 32 beats at rates less than 120 beats per minute  3. Hypokalemia-probably related to diuretic therapy  4. Recent right knee replacement  5. Right bundle branch block; no evidence for bradycardia or high-grade AV block  6. Obesity      Plan:  MRI scan look quite good.  The pulmonary valve and tricuspid valve were quite satisfactory  No myocardial fibrosis or scarring was noted  Left ventricular function was good   Blood work today will include a basic metabolic profile to assess potassium level  Follow-up with Dr. Yancey in 6 months-he is our local specialist for adult patients with congenital heart disease  Return in 1 year for further evaluation of arrhythmia  I would obtain a Holter monitor at that time    Subjective:    I had the opportunity to see.Alma Rosa Mak , who is a 56 y.o. female with a known history of corrected congenital heart disease, previously tetralogy of flow  She underwent successful knee replacement, has made a good recovery and now is in a high level of activity and does good exercise  She rarely has palpitations no awareness of tachydysrhythmia nothing that feels like syncopal or near syncopal-like spells  She had a comprehensive cardiac MRI scan.  This showed the left ventricle to be normal or borderline reduced ejection fraction at 52%  Moderate right ventricular enlargement is noted and there is  decrease RV ejection fraction  Shunts  The valve is a bioprosthetic valve with satisfactory function.  Tricuspid repair looks good with only mild tricuspid regurgitation  She has a moderate amount of pedal edema that seems well controlled with diuretics  No particular shortness of breath  Chest pain    Problem List:  Patient Active Problem List   Diagnosis     Palpitations     Hypokalemia     Atrial Premature Complex     Premature Ventricular Contractions     Tetralogy Of Fallot     Status post total left knee replacement using cement     Essential hypertension     Tetralogy of Fallot s/p repair     Medical History:  Past Medical History:   Diagnosis Date     CHF (congestive heart failure)     diastolic     Depression      GERD (gastroesophageal reflux disease)      [place] (hard of hearing)      Hypertension      Menorrhagia with irregular cycle      Migraine      PAC (premature atrial contraction)      Sleep apnea     CPAP     Tetralogy of Fallot      Surgical History:  Past Surgical History:   Procedure Laterality Date     BUNIONECTOMY Bilateral      GASTRIC BYPASS       MITRAL VALVE REPLACEMENT  2010     DE REPLACEMENT, PULMONARY VALVE  2010    Description: Pulmonary Valve Replacement;  Recorded: 02/20/2012;     DE TOTAL KNEE ARTHROPLASTY Left 10/18/2016    Procedure: LEFT KNEE TOTAL ARTHROPLASTY;  Surgeon: Chucky Grove MD;  Location: [facility] OR;  Service: Orthopedics     Social History:  Social History     Social History     Marital status:      Spouse name: N/A     Number of children: N/A     Years of education: N/A     Occupational History     Not on file.     Social History Main Topics     Smoking status: Never Smoker     Smokeless tobacco: Never Used     Alcohol use Yes      Comment: rare     Drug use: No     Sexual activity: Not on file     Other Topics Concern     Not on file     Social History Narrative       Review of Systems:      General: Night Sweats  Eyes: WNL  Ears/Nose/Throat:  WNL  Lungs: WNL  Heart: WNL  Stomach: WNL  Bladder: WNL  Muscle/Joints: WNL  Skin: WNL  Nervous System: WNL  Mental Health: WNL     Blood: WNL        Family History:  Family History   Problem Relation Age of Onset     Heart disease Father      Mental illness Sister          Allergies:  Allergies   Allergen Reactions     Ibuprofen Itching and Swelling     Throat constriction and urticaria     Sulfa (Sulfonamide Antibiotics) Rash       Medications:  Current Outpatient Prescriptions   Medication Sig Dispense Refill     acetaminophen (TYLENOL) 500 MG tablet Take 500-1,000 mg by mouth every 6 (six) hours as needed for pain.       albuterol (PROVENTIL HFA;VENTOLIN HFA) 90 mcg/actuation inhaler Inhale 2 puffs every 4 (four) hours as needed for wheezing or shortness of breath.        amoxicillin (AMOXIL) 500 MG capsule Take 2,000 mg by mouth as needed (prior to dental procedures).       buPROPion (WELLBUTRIN SR) 150 MG 12 hr tablet Take 150 mg by mouth 2 (two) times a day.       CALCIUM CARBONATE/VITAMIN D3 (CALCIUM 600 + D,3, ORAL) Take 1 tablet by mouth 2 (two) times a day.       cholecalciferol, vitamin D3, 1,000 unit tablet Take 1,000 Units by mouth every evening.        cyanocobalamin 500 MCG tablet Take 500 mcg by mouth daily.        cyclobenzaprine (FLEXERIL) 10 MG tablet Take 10 mg by mouth 3 (three) times a day as needed for muscle spasms.       HYDROcodone-acetaminophen 5-325 mg per tablet TAKE 1-2 TABLETS BY MOUTH EVERY 4 HOURS AS NEEDED FOR PAIN. **APPT NEEDED FOR FURTHER REFILLS**  0     isometheptene-acetaminophen-dichloralphenazone (MIDRIN) -325 mg per capsule Take 1-2 capsules by mouth 4 (four) times a day as needed for migraine.       liothyronine (CYTOMEL) 25 MCG tablet Take 25 mcg by mouth 2 (two) times a day.       losartan (COZAAR) 50 MG tablet Take 50 mg by mouth every evening.       multivitamin therapeutic (THERAGRAN) tablet Take 1 tablet by mouth 2 (two) times a day.        "OMEGA-3S/DHA/EPA/FISH OIL (OMEGA 3 ORAL) Take 1 capsule by mouth daily.       omeprazole (PRILOSEC) 20 MG capsule Take 20 mg by mouth daily.       potassium chloride SA (K-DUR,KLOR-CON) 20 MEQ tablet Take 20 mEq by mouth 4 (four) times a day.       sertraline (ZOLOFT) 100 MG tablet Take 150 mg by mouth every evening. (1.5 x 100 mg tabs = 150 mg dose)       spironolactone (ALDACTONE) 25 MG tablet Take 1 tablet (25 mg total) by mouth daily. 90 tablet 5     triamterene-hydrochlorothiazide (MAXZIDE-25) 37.5-25 mg per tablet Take 1 tablet by mouth daily. changed per cardiologist-Tina       LORazepam (ATIVAN) 1 MG tablet Take ativan for MRI scan; take  2 mg 2 tablet 0     warfarin (COUMADIN) 1 MG tablet Take 1 tab q day or as directed per INR results x 6 weeks. (Patient taking differently: Take 3 mg by mouth daily. Indications: Deep Vein Thrombosis Prevention) 80 tablet 0     No current facility-administered medications for this visit.      Objective:   Vital signs:  /70 (Patient Site: Left Arm, Patient Position: Sitting, Cuff Size: Adult Large)  Pulse 74  Resp 16  Ht 5' 3.78\" (1.62 m)  Wt (!) 246 lb 4.8 oz (111.7 kg)  LMP 06/27/2016  BMI 42.57 kg/m2      Physical Exam:      GENERAL APPEARANCE: Alert, cooperative and in no acute distress.  HEENT: No scleral icterus. No Xanthelasma. Oral mucous membranes pink and moist.  NECK: JVP neck veins seem flat there was no hepatic jugular refluxcm. No Hepatojugular reflux. Thyroid not  Palpable  CHEST: clear to auscultation and percussion  CARDIOVASCULAR: S1, S2 prosthetic valve tones are present there is a 2/6 systolic ejection murmur loudest at sternum I did not appreciate diastolic murmurs     Brachial, radial  pulses are intact and symmetric.   No carotid bruits noted.  ABDOMEN: Non tender. BS+. No bruits.  EXTREMITIES: No cyanosis, clubbing or edema.    Lab Results:  LIPIDS:  Lab Results   Component Value Date    CHOL 185 02/03/2015     Lab Results   Component " Value Date    HDL 76 02/03/2015     Lab Results   Component Value Date    LDLCALC 93 02/03/2015     Lab Results   Component Value Date    TRIG 81 02/03/2015     No components found for: CHOLHDL    BMP:  Lab Results   Component Value Date    CREATININE 0.61 10/19/2016    BUN 9 10/19/2016     (L) 10/19/2016    K 3.6 10/26/2016     10/19/2016    CO2 28 10/19/2016         This note has been dictated using voice recognition software. Any grammatical or context distortions are unintentional and inherent to the software.  Michi Salazar MD  [facility] HEART [facility]  [phone]

## 2021-06-14 NOTE — TELEPHONE ENCOUNTER
If she is feeling okay and its a regional block I would think that we can forge ahead.  If is more involved she should let us know.mdg

## 2021-06-14 NOTE — TELEPHONE ENCOUNTER
----- Message from Makenna Morelos sent at 1/12/2021 11:25 AM CST -----      Caller: Patient  Primary cardiologist: Dr. Yancey    Detailed reason for call: Patient is having surgery on her toe 1/18/2021 and the surgeon is wondering if she is cleared for surgery by her cardiologist. She did see Dr. Yancey 3/3/2020. Please advise    Best phone number: [phone]  Best time to contact: today  Ok to leave a detailed message? yes  Device? no    Additional Info:

## 2021-06-14 NOTE — TELEPHONE ENCOUNTER
ROMAN stating recommendations and asked for call back to [phone] with any questions.  -[place]

## 2021-06-14 NOTE — TELEPHONE ENCOUNTER
Dr Yancey - pt broke big toe and is having surgery on it 1/18/21.  Pt believes this will be regional block.  Will she need to see you for cardiac risk eval prior or is she ok to have surgery?  -[place]

## 2021-06-16 PROBLEM — I50.32 CHRONIC DIASTOLIC HEART FAILURE (H): Status: ACTIVE | Noted: 2019-10-29

## 2021-06-16 PROBLEM — Z96.652 STATUS POST REVISION OF TOTAL KNEE REPLACEMENT, LEFT: Status: ACTIVE | Noted: 2019-10-29

## 2021-06-16 PROBLEM — E66.01 MORBID OBESITY (H): Status: ACTIVE | Noted: 2021-04-23

## 2021-06-16 PROBLEM — Z96.651 STATUS POST TOTAL KNEE REPLACEMENT, RIGHT: Status: ACTIVE | Noted: 2020-02-27

## 2021-06-16 NOTE — PATIENT INSTRUCTIONS - HE
Please update me after you have seen the endocrine doctor.We could consider cautiously decreasing the potassium.Call will any heart related questions.My nurse is Josefina and her number is [phone]

## 2021-06-19 NOTE — LETTER
Letter by Josefina Hernandez RN at      Author: Josefina Hernandez RN Service: -- Author Type: --    Filed:  Encounter Date: 10/11/2019 Status: Signed         October 15, 2019     Patient: Alma Rosa Mak   YOB: 1960   Date of Visit: 07/02/2019       To Whom It May Concern:    Should be ok to proceed with close periop monitoring on tele, presuming no change in how she is doing since our visit.  mdg    If you have any questions or concerns, please don't hesitate to call.    Sincerely,        Electronically signed by Josefina Hernandez RN for Dr Johnny Yancey

## 2021-06-20 ENCOUNTER — HEALTH MAINTENANCE LETTER (OUTPATIENT)
Age: 61
End: 2021-06-20

## 2021-06-28 NOTE — PROGRESS NOTES
Progress Notes by Sharan Nieto MD (Ted) at 2/19/2020  8:20 AM     Author: Sharan Nieto MD (Ted) Service: -- Author Type: Physician    Filed: 2/19/2020  8:41 AM Encounter Date: 2/19/2020 Status: Signed    : Sharan Nieto MD (Ted) (Physician)             Thank you, Dr. Gibbs, for asking [facility] to evaluate Ms. Alma Rosa Mak.      Assessment/Recommendations   Assessment:    Tetralogy of Fallot, status post corrective surgery at age of 7 and second surgery in 2010 including bioprosthetic pulmonic valve, repair of tricuspid valve, closure of VSD and Maze procedure.  Stable, no heart failure or significant dysrhythmias  RV enlargement secondary to congenital heart disease, compensated without signs of right heart failure  History of PVCs, asymptomatic  Bioprosthetic pulmonic valve, without significant stenosis or regurgitation  DJD right knee  Obesity    Plan:  She appears to be stable from cardiac standpoint to undergo right replacement surgery knee.  Continue current cardiac medications    Follow-up with Dr. Yancey on annual basis       History of Present Illness    Ms. Alma Rosa Mak is a 59 y.o. female who comes to [facility] clinic for preop evaluation.  She has DJD of right knee.  She is scheduled to undergo replacement.  Her primary cardiologist requested to be a cardiac evaluation because of extensive cardiac history.  She has tetralogy of follow and she had 2 previous surgery.  Those most recent one was performed in 2010 at which time she had replacement of the pulmonic valve with bioprosthesis repair of tricuspid valve and VSD repair.  She has done well since that time.  She denies chest pain, shortness of breath, heart palpitations.  She has not had syncope.  She denies PND and orthopnea.  She had cardiac MR in August 2019.  She did not have any shunts and pulmonic present bioprosthesis was competent.  In October 2019 she underwent left  knee replacement surgery without cardiac complications.    Cardiac MR: August 2019  1.  Mildly dilated left ventricular size with normal wall thickness and mildly reduced global hypokinesis. The quantified left ventricular ejection fraction is 46%. No myocardial scar is identified.    2.  No indication of intracardiac shunt.  The ratio of Qp/Qs did not indicate intracardiac shunt.  3.  Moderately to severely enlarged right ventricular size with mildly reduced right ventricular function. RVEF is 42%.  4.  Moderately enlarged left atrium and severely enlarged right atrium.  5.  Moderate mitral and tricuspid valve regurgitation.  It is noticed tricuspid annulus ring.  6.  Bioprosthetic pulmonic valve in the position.  The peak of velocity of pulmonic valve is 2.1 M/sec with mild regurgitation.     7.  Mild stenosis in distal main pulmonary artery and also mild stenosis in proximal right and left pulmonary arteries.  It is noticed mild dilation of both pulmonary artery branches.     Physical Examination Review of Systems   Vitals:    02/19/20 0813   BP: 118/80   Pulse: 80   Resp: 16     Body mass index is 42.74 kg/m .  Wt Readings from Last 3 Encounters:   02/19/20 (!) 249 lb (112.9 kg)   10/29/19 (!) 250 lb (113.4 kg)   07/18/19 (!) 252 lb (114.3 kg)     General Appearance:   Alert, cooperative, no distress, appears stated age   Head/ENT: Normocephalic, without obvious abnormality. Membranes moist      EYES:  no scleral icterus, normal conjunctivae   Neck: Supple, symmetrical, trachea midline, no adenopathy, thyroid: not enlarged, symmetric, no carotid bruit or JVD   Chest/Lungs:   Lungs are clear to auscultation, respirations unlabored. No tenderness or deformity    Cardiovascular:   Regular rhythm with for ectopies, S1, S2 normal, no , rub or gallop.  2/6 systolic ejection murmur in the left upper sternal border.  No diastolic murmur   Abdomen:  Soft, non-tender, bowel sounds active all four quadrants,  no masses, no  organomegaly   Extremities: no cyanosis or clubbing. No edema   Skin: Skin color, texture, turgor normal, no rashes or lesions.    Psychiatric: Normal affect, calm   Neurologic: Alert and oriented x 3, moving all four extremities.     General: WNL  Eyes: WNL  Ears/Nose/Throat: WNL  Lungs: WNL  Heart: Leg Swelling  Stomach: WNL  Bladder: WNL  Muscle/Joints: WNL  Skin: WNL  Nervous System: WNL  Mental Health: WNL     Blood: WNL     Medical History  Surgical History Family History Social History   Past Medical History:   Diagnosis Date   ? Anemia     daily supplement.  no transfusion hx   ? Arthritis     OA   ? CHF (congestive heart failure) (H)     diastolic   ? Depression    ? Disease of thyroid gland    ? GERD (gastroesophageal reflux disease)    ? Hay fever    ? [place] (hard of hearing)    ? Hypertension    ? Menorrhagia with irregular cycle    ? Migraine    ? PAC (premature atrial contraction)    ? PONV (postoperative nausea and vomiting)    ? Sleep apnea     CPAP   ? Tetralogy of Fallot     Past Surgical History:   Procedure Laterality Date   ? BUNIONECTOMY Bilateral    ? CHOLECYSTECTOMY     ? FOOT SURGERY     ? GASTRIC BYPASS     ? JOINT REPLACEMENT     ? KNEE ARTHROSCOPY     ? MITRAL VALVE REPLACEMENT  2010   ? MI REPLACEMENT, PULMONARY VALVE  2010    Description: Pulmonary Valve Replacement;  Recorded: 02/20/2012;   ? MI TOTAL KNEE ARTHROPLASTY Left 10/18/2016    Procedure: LEFT KNEE TOTAL ARTHROPLASTY;  Surgeon: Chucky Grove MD;  Location: [facility] OR;  Service: Orthopedics   ? REVISION TOTAL KNEE ARTHROPLASTY Left 10/29/2019    Procedure: REVISION TOTAL KNEE ARTHROPLASTY, LEFT TIBIAL REVISON;  Surgeon: Cezar Lockwood DO;  Location: [facility];  Service: Orthopedics    no family history of premature coronary artery disease Social History     Socioeconomic History   ? Marital status:      Spouse name: Not on file   ? Number of children: Not on file   ? Years of education: Not on file   ?  Highest education level: Not on file   Occupational History   ? Not on file   Social Needs   ? Financial resource strain: Not on file   ? Food insecurity:     Worry: Not on file     Inability: Not on file   ? Transportation needs:     Medical: Not on file     Non-medical: Not on file   Tobacco Use   ? Smoking status: Never Smoker   ? Smokeless tobacco: Never Used   Substance and Sexual Activity   ? Alcohol use: Yes     Comment: rare   ? Drug use: Yes     Types: Marijuana     Comment: Medical marijuana started 9/19   ? Sexual activity: Not on file   Lifestyle   ? Physical activity:     Days per week: Not on file     Minutes per session: Not on file   ? Stress: Not on file   Relationships   ? Social connections:     Talks on phone: Not on file     Gets together: Not on file     Attends Spiritism service: Not on file     Active member of club or organization: Not on file     Attends meetings of clubs or organizations: Not on file     Relationship status: Not on file   ? Intimate partner violence:     Fear of current or ex partner: Not on file     Emotionally abused: Not on file     Physically abused: Not on file     Forced sexual activity: Not on file   Other Topics Concern   ? Not on file   Social History Narrative   ? Not on file          Medications  Allergies   Current Outpatient Medications   Medication Sig Dispense Refill   ? acetaminophen (TYLENOL) 500 MG tablet Take 2 tablets (1,000 mg total) by mouth 3 (three) times a day. (Patient taking differently: Take 1,000 mg by mouth as needed. )  0   ? albuterol (PROVENTIL HFA;VENTOLIN HFA) 90 mcg/actuation inhaler Inhale 2 puffs every 4 (four) hours as needed for wheezing or shortness of breath.      ? amoxicillin (AMOXIL) 500 MG capsule Take 2,000 mg by mouth as needed (prior to dental procedures).     ? CALCIUM CARBONATE/VITAMIN D3 (CALCIUM 600 + D,3, ORAL) Take 1 tablet by mouth 2 (two) times a day.     ? cholecalciferol, vitamin D3, 1,000 unit tablet Take 1,000  Units by mouth every evening.      ? cyanocobalamin 500 MCG tablet Take 500 mcg by mouth daily.      ? cyclobenzaprine (FLEXERIL) 10 MG tablet Take 10 mg by mouth 3 (three) times a day as needed for muscle spasms.     ? DULoxetine (CYMBALTA) 30 MG capsule Take 60 mg by mouth daily.            ? DULoxetine (CYMBALTA) 30 MG capsule Take 30 mg by mouth at bedtime.     ? ferrous sulfate 325 (65 FE) MG tablet Take 1 tablet by mouth every other day.  2   ? HYDROcodone-acetaminophen 5-325 mg per tablet Take 1-2 tablets by mouth as needed.     ? liothyronine (CYTOMEL) 25 MCG tablet Take 25 mcg by mouth 2 (two) times a day.     ? losartan (COZAAR) 50 MG tablet Take 50 mg by mouth every evening.     ? medical cannabis (Patient's own supply) 1 Dose by Other route see administration instructions. (The purpose of this order is to document that the patient reports taking medical cannabis. This is not a prescription, and is not used to certify that the patient has a  qualifying medical condition.)     ? multivitamin therapeutic (THERAGRAN) tablet Take 1 tablet by mouth 2 (two) times a day.     ? omeprazole (PRILOSEC) 20 MG capsule Take 20 mg by mouth daily.     ? polyethylene glycol (MIRALAX) 17 gram packet Take 1 packet (17 g total) by mouth daily.  0   ? potassium chloride SA (K-DUR,KLOR-CON) 20 MEQ tablet Take 20 mEq by mouth 4 (four) times a day.     ? rivaroxaban (XARELTO) 10 mg tablet Take 1 tablet (10 mg total) by mouth at bedtime. 30 tablet 0   ? spironolactone (ALDACTONE) 25 MG tablet Take 25 mg by mouth every evening.     ? triamterene-hydrochlorothiazide (DYAZIDE) 37.5-25 mg per capsule Take 1 capsule by mouth daily.  1   ? OMEGA-3S/DHA/EPA/FISH OIL (OMEGA 3 ORAL) Take 1 capsule by mouth daily.     ? oxyCODONE (ROXICODONE) 5 MG immediate release tablet Take 1-2 tablets (5-10 mg total) by mouth every 4 (four) hours as needed. 42 tablet 0     No current facility-administered medications for this visit.       Allergies    Allergen Reactions   ? Ibuprofen Itching and Swelling     Throat constriction and urticaria   ? Sulfa (Sulfonamide Antibiotics) Rash         Lab Results    Chemistry/lipid CBC Cardiac Enzymes/BNP/TSH/INR   Lab Results   Component Value Date    CHOL 157 06/17/2019    HDL 57 06/17/2019    LDLCALC 81 06/17/2019    TRIG 95 06/17/2019    CREATININE 0.67 02/13/2020    BUN 13 02/13/2020    K 4.8 02/13/2020     02/13/2020     02/13/2020    CO2 29 02/13/2020    Lab Results   Component Value Date    WBC 7.6 12/07/2018    HGB 11.8 (L) 10/31/2019    HCT 36.5 12/07/2018    MCV 76 (L) 12/07/2018     12/07/2018    Lab Results   Component Value Date    TROPONINI <0.01 02/17/2012    BNP 52 09/11/2018    TSH 0.80 02/13/2020    INR 0.93 10/29/2019

## 2021-06-29 NOTE — PROGRESS NOTES
Progress Notes by Johnny Yancey MD at 9/18/2020 12:50 PM     Author: Johnny Yancey MD Service: -- Author Type: Physician    Filed: 4/22/2021  9:25 PM Encounter Date: 9/18/2020 Status: Addendum    : Johnny Yancey MD (Physician)    Related Notes: Original Note by Johnny Yancey MD (Physician) filed at 9/18/2020  1:37 PM             [facility] Progress Note    Assessment:  1.  History of tetralogy of fallot with prior surgical interventions as outlined below.  Patient reports that she is been feeling well.  No specific symptoms of heart failure or palpitations.  Results of prior cardiac MRI and echocardiogram are reviewed.  They are going to plan follow-up echocardiogram to follow-up left ventricular function and moderate valvular regurgitation.    2.  History of premature atrial and ventricular complexes.  No complaints of syncope or near syncope.  Plan follow-up Holter monitor.    3.  Mild lower extremity edema. She would like to resume 12.5 mg of hydrochlorothiazide.  .  Most recent electrolytes are reviewed and plan to repeat electrolytes after she is been back on hydrochlorothiazide for 1 week.  Historically she is running on lower potassium.    4.  Risk modification she has an LDL cholesterol from June 2019 of 81.  Plan:  Plan as outlined above with follow-up in 6 months and further recommendations pending the outcome of the plan tests.  1. Benign essential hypertension  hydroCHLOROthiazide (HYDRODIURIL) 12.5 MG tablet    Basic metabolic panel    Magnesium    Echo Complete   2. Tetralogy of Fallot  Echo Complete   3. PVC's (premature ventricular contractions)  Holter Monitor         An After Visit Summary was printed and given to the patient.    Subjective:    Alma Rosa Mak is a 60 y.o. female who returned for a planned  follow up visit.  She reports that she is been feeling well from a cardiovascular standpoint.  She has been walking regularly 1 to 3 miles on a regular  basis.  Reports no significant shortness of breath, orthopnea, chest discomfort, dizziness or lightheadedness or palpitations.  She has a history of tetralogy of fallot initial operation at age 7 and subsequent procedure at the [facility] 2010 including tricuspid valve repair, closure of VSD, right-sided Maze procedure and porcine pulmonic valve placed at that time.  She does have a history of premature ventricular complexes with the most recent Holter monitor reviewed from 1 year ago as outlined.     MRI from 1 year ago August 2019 reported left ventricular function to be mildly reduced although echocardiogram suggested LV function to be normal.  No intracardiac shunt, moderate enlarged right ventricle with mild reduction in right ventricular function.  Biatrial enlargement with moderate mitral and moderate tricuspid insufficiency.  Bioprosthetic valve in the pulmonic  position, mild stenosis of the distal main pulmonary artery and mild stenosis in the proximal right and left pulmonary arteries with mild dilatation of both pulmonary artery branches.  She has had a history of hypokalemia and is required potassium supplementation.    Her primary care physician suggested that she discontinue the hydrochlorothiazide after noting some head sweating and pruritus.  This symptom did not change with this relation her medication including up titration of losartan.  She is under the impression that it was perhaps more related to the duloxetine.  She does have some mild lower extremity edema and would like to consider going back on low-dose hydrochlorothiazide which we reviewed today.  She does follow endocarditis prophylaxis guidelines.  Congratulated her on recent weight loss.  Review of Systems:   General: WNL  Eyes: WNL  Ears/Nose/Throat: WNL  Lungs: WNL  Heart: WNL  Stomach: WNL  Bladder: WNL  Muscle/Joints: WNL  Skin: WNL  Nervous System: WNL  Mental Health: WNL     Blood: WNL      Problem List:    Patient Active  Problem List   Diagnosis   ? Palpitations   ? Hypokalemia   ? Atrial Premature Complex   ? PVC's (premature ventricular contractions)   ? Tetralogy of Fallot   ? Status post total left knee replacement using cement   ? Essential hypertension   ? Tetralogy of Fallot s/p repair   ? Status post revision of total knee replacement, left   ? Chronic diastolic heart failure (H)   ? Chronic pain syndrome   ? Status post total knee replacement, right       Social History     Socioeconomic History   ? Marital status:      Spouse name: Not on file   ? Number of children: Not on file   ? Years of education: Not on file   ? Highest education level: Not on file   Occupational History   ? Not on file   Social Needs   ? Financial resource strain: Not on file   ? Food insecurity     Worry: Not on file     Inability: Not on file   ? Transportation needs     Medical: Not on file     Non-medical: Not on file   Tobacco Use   ? Smoking status: Never Smoker   ? Smokeless tobacco: Never Used   Substance and Sexual Activity   ? Alcohol use: Yes     Comment: rare   ? Drug use: Yes     Types: Marijuana     Comment: Medical marijuana started 9/19   ? Sexual activity: Not on file   Lifestyle   ? Physical activity     Days per week: Not on file     Minutes per session: Not on file   ? Stress: Not on file   Relationships   ? Social connections     Talks on phone: Not on file     Gets together: Not on file     Attends Muslim service: Not on file     Active member of club or organization: Not on file     Attends meetings of clubs or organizations: Not on file     Relationship status: Not on file   ? Intimate partner violence     Fear of current or ex partner: Not on file     Emotionally abused: Not on file     Physically abused: Not on file     Forced sexual activity: Not on file   Other Topics Concern   ? Not on file   Social History Narrative   ? Not on file       Family History   Problem Relation Age of Onset   ? Heart disease Father     ? Mental illness Sister        Current Outpatient Medications   Medication Sig Dispense Refill   ? acetaminophen (TYLENOL) 500 MG tablet Take 1,000 mg by mouth every 6 (six) hours as needed for pain.     ? albuterol (PROVENTIL HFA;VENTOLIN HFA) 90 mcg/actuation inhaler Inhale 2 puffs every 4 (four) hours as needed for wheezing or shortness of breath.      ? amoxicillin (AMOXIL) 500 MG capsule Take 2,000 mg by mouth as needed (prior to dental procedures).     ? CALCIUM CARBONATE/VITAMIN D3 (CALCIUM 600 + D,3, ORAL) Take 1 tablet by mouth 2 (two) times a day.     ? cholecalciferol, vitamin D3, 1,000 unit tablet Take 1,000 Units by mouth every evening.      ? cyanocobalamin 500 MCG tablet Take 500 mcg by mouth daily.      ? cyclobenzaprine (FLEXERIL) 10 MG tablet Take 10 mg by mouth 3 (three) times a day as needed for muscle spasms.     ? DULoxetine (CYMBALTA) 30 MG capsule Take 60 mg by mouth every morning.      ? DULoxetine (CYMBALTA) 30 MG capsule Take 30 mg by mouth at bedtime.     ? ferrous sulfate 325 (65 FE) MG tablet Take 1 tablet by mouth. Twice a week  2   ? HYDROcodone-acetaminophen 5-325 mg per tablet TAKE 1 2 TABS EVERY 6 8 HOURS AS NEEDED FOR CHRONIC PAIN. MAX 3/DAY.     ? liothyronine (CYTOMEL) 25 MCG tablet Take 25 mcg by mouth 2 (two) times a day.     ? losartan (COZAAR) 50 MG tablet Take 50 mg by mouth 2 (two) times a day.      ? medical cannabis (Patient's own supply) 1 Dose by Other route see administration instructions. (The purpose of this order is to document that the patient reports taking medical cannabis. This is not a prescription, and is not used to certify that the patient has a  qualifying medical condition.)     ? multivitamin therapeutic (THERAGRAN) tablet Take 1 tablet by mouth 2 (two) times a day.     ? omeprazole (PRILOSEC) 20 MG capsule Take 20 mg by mouth daily.     ? potassium chloride SA (K-DUR,KLOR-CON) 20 MEQ tablet Take 20 mEq by mouth 4 (four) times a day.     ?  "spironolactone (ALDACTONE) 25 MG tablet Take 25 mg by mouth every evening.     ? docusate sodium (COLACE) 100 MG capsule Take 1 capsule (100 mg total) by mouth 2 (two) times a day as needed for constipation.  0   ? hydroCHLOROthiazide (HYDRODIURIL) 12.5 MG tablet Take 1 tablet (12.5 mg total) by mouth daily. 90 tablet 3     No current facility-administered medications for this visit.        Objective:     /82 (Patient Site: Left Arm, Patient Position: Sitting, Cuff Size: Adult Large)   Pulse 68   Resp 16   Ht 5' 4\" (1.626 m)   Wt (!) 234 lb 6.4 oz (106.3 kg)   LMP 2016   BMI 40.23 kg/m    (!) 234 lb 6.4 oz (106.3 kg)   [unfilled]  Wt Readings from Last 3 Encounters:   20 (!) 234 lb 6.4 oz (106.3 kg)   20 (!) 235 lb (106.6 kg)   20 (!) 250 lb (113.4 kg)       Physical Exam:    GENERAL APPEARANCE: alert, no apparent distress  HEENT: no scleral icterus or xanthelasma  NECK: jugular venous pressure is within normal limits.  CHEST: symmetric, the lungs are clear to auscultation  CARDIOVASCULAR: regular rhythm with 3/6 systolic murmur upper sternal border  Abdomen: No Organomegaly, masses, bruits, or tenderness. Bowels sounds are present      EXTREMITIES: no cyanosis, clubbing, mild lower extremity edema    Cardiac Testing:  Reading physician: Theresa Spangler MD  Ordering physician: Johnny Yancey MD  Study date: 19    Performing Date  Performing Department    2019   CARDIAC TESTING [[id number]]    Patient Information     Patient Name   Alma Rosa Mak  MRN   [id number]  Sex   Female   1   1960 (58 y.o.)    Indications     Dx: Tetralogy of Fallot [Q21.3 (ICD-10-CM)]    Summary       1.Left ventricle ejection fraction is lower limits of normal. The calculated left ventricular ejection fraction is 64%.    2.The systolic function is mildly reduced. TAPSE is abnormal, which is consistent with abnormal right ventricular systolic function.    3.The aortic root " "is mildly dilated.    4.Pulmonic bioprosthetic replacement with mean gradient of 18 mmHg and trivial pulmonic insufficiency.    5.Mild pulmonary hypertension suggested.    6.When compared to the previous study dated 6/30/2016, left ventricular ejection fraction appears slightly diminished compared to prior, and mild pulmonary hypertension is now present. Pulmonic bioprosthetic gradient has increased.     Scan on 7/22/2019 10:50 AM by Manasa Godinez: Diary   Scan on 7/22/2019 11:59 AM           Indications     PVC's (premature ventricular contractions)    Tetralogy of Fallot    Conclusion     [facility]     HOLTER REPORT     Results:    Indication for study: PVCs, tetralogy of Fallot    The predominant rhythm throughout the tracing was normal sinus rhythm with an average heart rate of 87 bpm.  The chronotropic response to activities of daily living normal.  The WV interval was normal.  The QRS duration was prolonged with an IVCD.  The QT interval was normal.  The study demonstrated no significant bradycardia/pauses.    The patient demonstrated rare atrial ectopy.  Nonsustained ectopic atrial tachycardia was not observed.  Sustained ectopic atrial tachycardia, atrial fibrillation, or other supraventricular tachycardia was observed.    The patient demonstrated frequent ventricular ectopy accounting for 2.5% of the total ventricular depolarizations.  The PVC morphology of the predominant complex was left bundle normal axis however there were at least 2-3 other morphologies demonstrated.  The upstroke was quite delayed in the predominant morphology suggesting possible epicardial source of activation..  Complex ventricular ectopy was seen on one occasion with a 4 beat run of slow nonsustained ventricular tachycardia 105 bpm.  Sustained ventricular tachycardia was none observed.    The patient did return a diary.  Patient reported \"chest tightness\" which corresponded to normal sinus rhythm with heart rates in the " mid 80s.  The patient had a single isolated PVC demonstrated during that timeframe.     Impression:    Abnormal Holter monitor tracing by virtue of the presence of conduction system disease with a IVCD as well as increased ventricular ectopy.    Indication for study was PVCs.  The patient's PVC burden is 2.5% of the total ventricular depolarizations.  The patient's PVC may be arising from the outflow tract region but given the intrinsicoid deflection this may be epicardial.    No sustained atrial or ventricular tachyarrhythmia.    No profound bradycardia or pauses.        Comment: The recording was for 23 hours and 59 minutes.  The recording quality was adequate     MR Myocardium With Without Contrast   Order# [id number]   Reading physician: Zenaida Conley MD  Ordering physician: Johnny Yancey MD  Study date: 19    Patient Information     Patient Name   Alma Rosa Mak  MRN   [id number]  Sex   Female   1   1960 (59 y.o.)    Indications     Congenital heart disease    Dx: Tetralogy of Fallot s/p repair [Z87.74 (ICD-10-CM)]    Technical Details     TECHNIQUE:  ECG gated MRI of the heart with and without intravenous contrast. (19ml ml Gadavist of IV gadolinium) This was performed on a Siemens Area 1.5 Melinda MRI scanner.    Technical Quality     Axial HASTE and steady state free precession images, multiplanar steady-state free procession cine images, multiplanar gradient echo cine images, T1 and T2 Turbo spin-echo images with and without Fat saturation suppression, Grid tagging images, rest or first pass contrast perfusion images, cine images of post contrast injection, velocity encoded images, contrast-enhanced MRA, and delayed contrast-enhanced phase sensitive inversion recovery images were obtained.  Physical myocardial measurements were obtained by the physician using Vital imaging workstation.    FINDINGS:    LEFT VENTRICLE:  Left ventricle is mildly enlarged, normal wall thickness and mildly reduced  global systolic function. There is no regional wall motion abnormality.  There is no intraventricular mass or thrombus. No myocardial scar is identified.       RIGHT VENTRICLE:  Right ventricle is moderately to severely enlarged with mildly reduced right ventricular systolic function.  No aneurysmal wall or regional wall motion abnormalities. The calculated RVEF is 42%, EDV-2-6 ml, RVESV 132 ml, RVSV 94 ml, RVCO 9.5 l/min     The ratio of Qp/Qs is equal 1 which did not indicate intracardiac shunt.     ATRIA:  Moderately left atrial size.  Severely enlarged right atrial size. No atrial septal defect.  AORTIC VALVE:  Tricuspid valve.  No aortic insufficiency. No significant aortic stenosis.  MITRAL VALVE:  Mild thickening mitral valve structure. No mitral valve stenosis.  Mild to moderate mitral regurgitation.  TRICUSPID VALVE:  It appears a annulus ring in the position. No stenosis. Moderate tricuspid regurgitation.    PULMONIC VALVE:  Bioprosthetic pulmonic valve is in location with peak velocity of 2.1 m/sec and mild regurgitation.    PERICARDIUM:  No pericardial effusion.    CORONARY ARTERIES:  Normal right coronary and left coronary artery origin.   AORTA:  Normal thoracic size and its major branches.   PULMONARY ARTERY: There is mild stenosis in distal main pulmonary artery and the proximal segments of right and left pulmonary branches with mild dilation of pulmonary branches beyond mild stenosis.          EXTRACARDIAC STRUCTURES:  Please refer to radiology interpretation for extracardiac structures     IMPRESSIONS:    1.  Mildly dilated left ventricular size with normal wall thickness and mildly reduced global hypokinesis. The quantified left ventricular ejection fraction is 46%. No myocardial scar is identified.    2.  No indication of intracardiac shunt.  The ratio of Qp/Qs did not indicate intracardiac shunt.  3.  Moderately to severely enlarged right ventricular size with mildly reduced right ventricular  function. RVEF is 42%.  4.  Moderately enlarged left atrium and severely enlarged right atrium.  5.  Moderate mitral and tricuspid valve regurgitation.  It is noticed tricuspid annulus ring.  6.  Bioprosthetic pulmonic valve in the position.  The peak of velocity of pulmonic valve is 2.1 M/sec with mild regurgitation.     7.  Mild stenosis in distal main pulmonary artery and also mild stenosis in proximal right and left pulmonary arteries.  It is noticed mild dilation of both pulmonary artery branches     MUSE DIAGNOSIS   ECG Clinic - Today   Collected:  07/02/19 1034    Result status:  Final    Resulting lab:   MUSE    Value:  Normal sinus rhythm   Right bundle branch block   Abnormal ECG   When compared with ECG of 19-OCT-2016 05:39,   Premature atrial complexes are no longer Present   QT has shortened   Confirmed by DUSTIN BROWNLEE MD LOC:JN ([id number]) on 7/3/2019 3:57:19          Lab Results:    Lab Results   Component Value Date     08/25/2020    K 4.8 08/25/2020     08/25/2020    CO2 26 08/25/2020    BUN 11 08/25/2020    CREATININE 0.66 08/25/2020    CALCIUM 9.6 08/25/2020     Lab Results   Component Value Date    CHOL 157 06/17/2019    TRIG 95 06/17/2019    HDL 57 06/17/2019     BNP (pg/mL)   Date Value   09/11/2018 52   05/23/2016 88 (H)   08/17/2011 54     Creatinine (mg/dL)   Date Value   08/25/2020 0.66   02/13/2020 0.67   10/10/2019 0.70   06/17/2019 0.58 (L)     LDL Calculated (mg/dL)   Date Value   06/17/2019 81   12/07/2018 76   08/24/2017 77     Lab Results   Component Value Date    WBC 9.0 02/27/2020    WBC 11.7 (H) 07/22/2014    HGB 12.6 02/28/2020    HCT 45.0 02/27/2020    MCV 89 02/27/2020     02/27/2020         This note has been dictated using voice recognition software. Any grammatical or context distortions are unintentional and inherent to the software.

## 2021-06-29 NOTE — PROGRESS NOTES
"Progress Notes by Johnny Yancey MD at 4/28/2020 10:10 AM     Author: Johnny Yancey MD Service: -- Author Type: Physician    Filed: 9/18/2020 12:14 PM Encounter Date: 4/28/2020 Status: Addendum    : Johnny Yancey MD (Physician)    Related Notes: Original Note by Johnny Yancey MD (Physician) filed at 4/28/2020 10:50 AM           The patient has been notified of following:     \"This video visit will be conducted via a call between you and your physician/provider. We have found that certain health care needs can be provided without the need for an in-person physical exam.  This service lets us provide the care you need with a video conversation.  If a prescription is necessary we can send it directly to your pharmacy.  If lab work is needed we can place an order for that and you can then stop by our lab to have the test done at a later time.      Patient has given verbal consent to a Video visit? Yes    HEART CARE VIDEO ENCOUNTER        The patient has chosen to have the visit conducted as a video visit, to reduce risk of exposure given the current status of Coronavirus in our community. This video visit is being conducted via a call between the patient and physician/provider. Health care needs are being provided without a physical exam.     Assessment/Recommendations   Assessment/Plan:  1.  History of tetralogy of fallot with 2 prior surgeries.  Initial surgery as a child and second surgery at age 50 and 2010 as outlined below.  She continues to feel well and has no specific cardiovascular symptoms or complaints and will continue to monitor.  Cardiac MRI from a number of months ago was reviewed as well as the Holter monitor.    2.  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  No complaints of palpitation, dizziness, syncope or near syncope and she is asked to monitor for any symptoms.  Would like to repeat the Holter monitor when she follows up with me in a few months or " sooner if any specific issues were to arise.    3.  History of hypokalemia.  Most recent potassium at the end of February 2020 was normal.  She does require potassium supplementation and reportedly her chlorothiazide was changed to 12.5 mg daily without triamterene.  I have asked her to have a repeat laboratory study with upcoming plans for lab draw to compare by results.    Follow Up Plan:  4 months  I have reviewed the note as documented.  This accurately captures the substance of my conversation with the patient.    Total time of video between patient and provider was 12 minutes   Start Time:1020   Stop Time:1032    Originating Location (pt. Location): home    Distant Location (provider location):  [facility]     Mode of Communication:  Video Conference via doximtry       History of Present Illness/Subjective    Alma Rosa Mak is a 59 y.o. female who is being evaluated via a billable video visit and has consented to a video visit. Alma Rosa Mak has a history oftetralogy of fallot and had initial operation at age 7 at [facility].  She had additional procedure at the [facility] in 2010 including tricuspid valve repair, closure of VSD, right-sided Maze procedure and porcine pulmonic valve placed at that time.  She has a history of premature ventricular complexes and is been monitored with periodic Holter monitors.  She underwent a knee replacement surgery a few months ago and reports that she is feeling well.  She is still recovering from the knee surgery.  She reports no complaints of chest pain, dizziness, lightheadedness, palpitations, orthopnea or PND.  She has been trying to walk on a regular basis and is following appropriate social distancing.    Cardiac MRI as outlined below was completed August 2019 for left ventricular function was said to be mildly reduced at 46%.  No intracardiac shunt, moderately enlarged right ventricle with mild reduction in right regular function with  an ejection fraction of 42%.  Biatrial enlargement, moderate mitral and tricuspid insufficiency.  Bioprosthetic valve in the pulmonic position.  Mild stenosis in the distal main pulmonary artery and mild stenosis in the proximal right and left pulmonary arteries with mild dilatation of both pulmonary artery branches.  She has a history of hypokalemia and has required potassium supplement.  She does tell me that triamterene was discontinued and she is now just on hydrochlorothiazide 12.5 mg daily in combination with spironolactone.  Her last potassium was at the end of 2020 of 4.0 but apparently has not had repeat laboratory studies since having this change made by her primary care provider.  She indicates that she is scheduled for laboratory studies in the near future.  I have asked the chemistries be drawn at that time.  She does continue to follow appropriate endocarditis prophylaxis guidelines.    tient Information     Patient Name   Alma Rosa Mak  MRN   [id number]  Sex   Female   1   1960 (58 y.o.)    Indications     Dx: Tetralogy of Fallot [Q21.3 (ICD-10-CM)]    Summary       1.Left ventricle ejection fraction is lower limits of normal. The calculated left ventricular ejection fraction is 64%.    2.The systolic function is mildly reduced. TAPSE is abnormal, which is consistent with abnormal right ventricular systolic function.    3.The aortic root is mildly dilated.    4.Pulmonic bioprosthetic replacement with mean gradient of 18 mmHg and trivial pulmonic insufficiency.    5.Mild pulmonary hypertension suggested.    6.When compared to the previous study dated 2016, left ventricular ejection fraction appears slightly diminished compared to prior, and mild pulmonary hypertension is now present. Pulmonic bioprosthetic gradient has increased       Alma Rosa Mak  MRN   [id number]  Sex   Female   1   1960 (58 y.o.)    Order-Level Documents:     Scan on 2019 10:50 AM by Herbert  "Manasa: Diary   Scan on 7/22/2019 11:59 AM           Indications     PVC's (premature ventricular contractions)    Tetralogy of Fallot    Conclusion     [facility]     HOLTER REPORT     Results:    Indication for study: PVCs, tetralogy of Fallot    The predominant rhythm throughout the tracing was normal sinus rhythm with an average heart rate of 87 bpm.  The chronotropic response to activities of daily living normal.  The IA interval was normal.  The QRS duration was prolonged with an IVCD.  The QT interval was normal.  The study demonstrated no significant bradycardia/pauses.    The patient demonstrated rare atrial ectopy.  Nonsustained ectopic atrial tachycardia was not observed.  Sustained ectopic atrial tachycardia, atrial fibrillation, or other supraventricular tachycardia was observed.    The patient demonstrated frequent ventricular ectopy accounting for 2.5% of the total ventricular depolarizations.  The PVC morphology of the predominant complex was left bundle normal axis however there were at least 2-3 other morphologies demonstrated.  The upstroke was quite delayed in the predominant morphology suggesting possible epicardial source of activation..  Complex ventricular ectopy was seen on one occasion with a 4 beat run of slow nonsustained ventricular tachycardia 105 bpm.  Sustained ventricular tachycardia was none observed.    The patient did return a diary.  Patient reported \"chest tightness\" which corresponded to normal sinus rhythm with heart rates in the mid 80s.  The patient had a single isolated PVC demonstrated during that timeframe.     Impression:    Abnormal Holter monitor tracing by virtue of the presence of conduction system disease with a IVCD as well as increased ventricular ectopy.    Indication for study was PVCs.  The patient's PVC burden is 2.5% of the total ventricular depolarizations.  The patient's PVC may be arising from the outflow tract region but given the intrinsicoid " deflection this may be epicardial.    No sustained atrial or ventricular tachyarrhythmia.    No profound bradycardia or pauses     MR Myocardium With Without Contrast   Order# [id number]   Reading physician: Zenaida Conley MD  Ordering physician: Johnny Yancey MD  Study date: 19    Patient Information     Patient Name   Alma Rosa Mak  MRN   [id number]  Sex   Female   [birth year]   1960 (59 y.o.)    Indications     Congenital heart disease    Dx: Tetralogy of Fallot s/p repair [Z87.74 (ICD-10-CM)]    Technical Details     TECHNIQUE:  ECG gated MRI of the heart with and without intravenous contrast. (19ml ml Gadavist of IV gadolinium) This was performed on a Siemens Area 1.5 Melinda MRI scanner.    Technical Quality     Axial HASTE and steady state free precession images, multiplanar steady-state free procession cine images, multiplanar gradient echo cine images, T1 and T2 Turbo spin-echo images with and without Fat saturation suppression, Grid tagging images, rest or first pass contrast perfusion images, cine images of post contrast injection, velocity encoded images, contrast-enhanced MRA, and delayed contrast-enhanced phase sensitive inversion recovery images were obtained.  Physical myocardial measurements were obtained by the physician using Vital imaging workstation.    FINDINGS:    LEFT VENTRICLE:  Left ventricle is mildly enlarged, normal wall thickness and mildly reduced global systolic function. There is no regional wall motion abnormality.  There is no intraventricular mass or thrombus. No myocardial scar is identified.       RIGHT VENTRICLE:  Right ventricle is moderately to severely enlarged with mildly reduced right ventricular systolic function.  No aneurysmal wall or regional wall motion abnormalities. The calculated RVEF is 42%, EDV-2-6 ml, RVESV 132 ml, RVSV 94 ml, RVCO 9.5 l/min     The ratio of Qp/Qs is equal 1 which did not indicate intracardiac shunt.     ATRIA:  Moderately left atrial size.   Severely enlarged right atrial size. No atrial septal defect.  AORTIC VALVE:  Tricuspid valve.  No aortic insufficiency. No significant aortic stenosis.  MITRAL VALVE:  Mild thickening mitral valve structure. No mitral valve stenosis.  Mild to moderate mitral regurgitation.  TRICUSPID VALVE:  It appears a annulus ring in the position. No stenosis. Moderate tricuspid regurgitation.    PULMONIC VALVE:  Bioprosthetic pulmonic valve is in location with peak velocity of 2.1 m/sec and mild regurgitation.    PERICARDIUM:  No pericardial effusion.    CORONARY ARTERIES:  Normal right coronary and left coronary artery origin.   AORTA:  Normal thoracic size and its major branches.   PULMONARY ARTERY: There is mild stenosis in distal main pulmonary artery and the proximal segments of right and left pulmonary branches with mild dilation of pulmonary branches beyond mild stenosis.          EXTRACARDIAC STRUCTURES:  Please refer to radiology interpretation for extracardiac structures     IMPRESSIONS:    1.  Mildly dilated left ventricular size with normal wall thickness and mildly reduced global hypokinesis. The quantified left ventricular ejection fraction is 46%. No myocardial scar is identified.    2.  No indication of intracardiac shunt.  The ratio of Qp/Qs did not indicate intracardiac shunt.  3.  Moderately to severely enlarged right ventricular size with mildly reduced right ventricular function. RVEF is 42%.  4.  Moderately enlarged left atrium and severely enlarged right atrium.  5.  Moderate mitral and tricuspid valve regurgitation.  It is noticed tricuspid annulus ring.  6.  Bioprosthetic pulmonic valve in the position.  The peak of velocity of pulmonic valve is 2.1 M/sec with mild regurgitation.     7.  Mild stenosis in distal main pulmonary artery and also mild stenosis in proximal right and left pulmonary arteries.  It is noticed mild dilation of both pulmonary artery branches.         I have reviewed and updated the  patient's Past Medical History, Social History, Family History and Medication List.     Physical Examination performed via live video encounter Review of Systems   General Appearance:   no distress, normal body habitus, upright.   ENT/Mouth: membranes moist, no nasal discharge or bleeding gums.  Normal head shape, no evidence of injury or laceration.     EYES:  no scleral icterus, normal conjunctivae   Neck: lSupple, normal range of motion.   Chest/Lungs:   No audible wheezing equal chest wall expansion. Non labored breathing.  No cough.   Cardiovascular:      Abdomen:  n\. No observe juandice.     Extremities: \    Skin: \ No evidence of facial lacerations.      Neurologic: Normal arm motion bilateral, no tremors.  No evidence of focal defect.       Psychiatric: alert and oriented x3, calm                                               Medical History  Surgical History Family History Social History   Past Medical History:   Diagnosis Date   ? Anemia     daily supplement.  no transfusion hx   ? Arthritis     OA   ? CHF (congestive heart failure) (H)     diastolic   ? Chronic diastolic heart failure (H)    ? Depression    ? Disease of thyroid gland    ? GERD (gastroesophageal reflux disease)    ? Hay fever    ? [place] (hard of hearing)    ? Hypertension    ? Menorrhagia with irregular cycle    ? Migraine    ? PAC (premature atrial contraction)    ? Polyarthralgia    ? PONV (postoperative nausea and vomiting)    ? Sleep apnea     CPAP   ? Tetralogy of Fallot     Past Surgical History:   Procedure Laterality Date   ? BUNIONECTOMY Bilateral    ? CHOLECYSTECTOMY     ? FOOT SURGERY     ? GASTRIC BYPASS     ? HYSTERECTOMY     ? JOINT REPLACEMENT Left     knee and revision   ? KNEE ARTHROSCOPY     ? MITRAL VALVE REPLACEMENT  2010   ? NH REPLACEMENT, PULMONARY VALVE  2010    Description: Pulmonary Valve Replacement;  Recorded: 02/20/2012;   ? NH TOTAL KNEE ARTHROPLASTY Left 10/18/2016    Procedure: LEFT KNEE TOTAL ARTHROPLASTY;   Surgeon: Chucky Grove MD;  Location: [facility] Main OR;  Service: Orthopedics   ? [place] TOTAL KNEE ARTHROPLASTY Right 2/27/2020    Procedure: RIGHT TOTAL KNEE ARTHROPLASTY;  Surgeon: Cezar Lockwood DO;  Location: [facility] Main OR;  Service: Orthopedics   ? REVISION TOTAL KNEE ARTHROPLASTY Left 10/29/2019    Procedure: REVISION TOTAL KNEE ARTHROPLASTY, LEFT TIBIAL REVISON;  Surgeon: Cezar Lockwood DO;  Location: [facility] Main OR;  Service: Orthopedics    Family History   Problem Relation Age of Onset   ? Heart disease Father    ? Mental illness Sister       Social History     Socioeconomic History   ? Marital status:      Spouse name: Not on file   ? Number of children: Not on file   ? Years of education: Not on file   ? Highest education level: Not on file   Occupational History   ? Not on file   Social Needs   ? Financial resource strain: Not on file   ? Food insecurity     Worry: Not on file     Inability: Not on file   ? Transportation needs     Medical: Not on file     Non-medical: Not on file   Tobacco Use   ? Smoking status: Never Smoker   ? Smokeless tobacco: Never Used   Substance and Sexual Activity   ? Alcohol use: Yes     Comment: rare   ? Drug use: Yes     Types: Marijuana     Comment: Medical marijuana started 9/19   ? Sexual activity: Not on file   Lifestyle   ? Physical activity     Days per week: Not on file     Minutes per session: Not on file   ? Stress: Not on file   Relationships   ? Social connections     Talks on phone: Not on file     Gets together: Not on file     Attends Worship service: Not on file     Active member of club or organization: Not on file     Attends meetings of clubs or organizations: Not on file     Relationship status: Not on file   ? Intimate partner violence     Fear of current or ex partner: Not on file     Emotionally abused: Not on file     Physically abused: Not on file     Forced sexual activity: Not on file   Other Topics Concern   ? Not on file   Social  History Narrative   ? Not on file          Medications  Allergies   Current Outpatient Medications   Medication Sig Dispense Refill   ? acetaminophen (TYLENOL) 500 MG tablet Take 1,000 mg by mouth every 6 (six) hours as needed for pain.     ? albuterol (PROVENTIL HFA;VENTOLIN HFA) 90 mcg/actuation inhaler Inhale 2 puffs every 4 (four) hours as needed for wheezing or shortness of breath.      ? CALCIUM CARBONATE/VITAMIN D3 (CALCIUM 600 + D,3, ORAL) Take 1 tablet by mouth 2 (two) times a day.     ? cholecalciferol, vitamin D3, 1,000 unit tablet Take 1,000 Units by mouth every evening.      ? cyanocobalamin 500 MCG tablet Take 500 mcg by mouth daily.      ? cyclobenzaprine (FLEXERIL) 10 MG tablet Take 10 mg by mouth 3 (three) times a day as needed for muscle spasms.     ? docusate sodium (COLACE) 100 MG capsule Take 1 capsule (100 mg total) by mouth 2 (two) times a day as needed for constipation.  0   ? DULoxetine (CYMBALTA) 30 MG capsule Take 30 mg by mouth at bedtime.     ? ferrous sulfate 325 (65 FE) MG tablet Take 1 tablet by mouth every other day.  2   ? hydroCHLOROthiazide (MICROZIDE) 12.5 mg capsule Take by mouth daily.     ? hydroxyzine HCL (ATARAX) 25 MG tablet Take 1 tablet (25 mg total) by mouth every 4 (four) hours as needed. (for pain, muscle spasms, anxiety, itching) 30 tablet 0   ? liothyronine (CYTOMEL) 25 MCG tablet Take 25 mcg by mouth 2 (two) times a day.     ? losartan (COZAAR) 50 MG tablet Take 50 mg by mouth every evening.     ? medical cannabis (Patient's own supply) 1 Dose by Other route see administration instructions. (The purpose of this order is to document that the patient reports taking medical cannabis. This is not a prescription, and is not used to certify that the patient has a  qualifying medical condition.)     ? multivitamin therapeutic (THERAGRAN) tablet Take 1 tablet by mouth 2 (two) times a day.     ? omeprazole (PRILOSEC) 20 MG capsule Take 20 mg by mouth daily.     ? oxyCODONE  (ROXICODONE) 5 MG immediate release tablet Take 1-2 tablets (5-10 mg total) by mouth every 4 (four) hours as needed. 42 tablet 0   ? potassium chloride SA (K-DUR,KLOR-CON) 20 MEQ tablet Take 20 mEq by mouth 4 (four) times a day.     ? spironolactone (ALDACTONE) 25 MG tablet Take 25 mg by mouth every evening.     ? amoxicillin (AMOXIL) 500 MG capsule Take 2,000 mg by mouth as needed (prior to dental procedures).     ? DULoxetine (CYMBALTA) 30 MG capsule Take 60 mg by mouth every morning.        No current facility-administered medications for this visit.     Allergies   Allergen Reactions   ? Ibuprofen Itching and Swelling     Throat constriction and urticaria   ? Sulfa (Sulfonamide Antibiotics) Rash         Lab Results    Chemistry/lipid CBC Cardiac Enzymes/BNP/TSH/INR   Lab Results   Component Value Date    CHOL 157 06/17/2019    HDL 57 06/17/2019    LDLCALC 81 06/17/2019    TRIG 95 06/17/2019    CREATININE 0.67 02/13/2020    BUN 13 02/13/2020    K 4.0 02/28/2020     02/13/2020     02/13/2020    CO2 29 02/13/2020    Lab Results   Component Value Date    WBC 9.0 02/27/2020    HGB 12.6 02/28/2020    HCT 45.0 02/27/2020    MCV 89 02/27/2020     02/27/2020    Lab Results   Component Value Date    TROPONINI <0.01 02/17/2012    BNP 52 09/11/2018    TSH 0.80 02/13/2020    INR 0.93 10/29/2019        Johnny Yancey

## 2021-06-30 NOTE — PROGRESS NOTES
Progress Notes by Johnny Yancey MD at 4/23/2021  9:50 AM     Author: Johnny Yancey MD Service: -- Author Type: Physician    Filed: 4/23/2021 10:33 AM Encounter Date: 4/23/2021 Status: Signed    : Johnny Yancey MD (Physician)             [facility] Progress Note    Assessment:  1.  History of Tetralogy of Fallot with prior surgical intervention as described above.  She had a cardiac MRI in August 2019 where left ventricular function was mildly reduced at 46%, right ventricular function mildly reduced, moderate mitral and tricuspid insufficiency.  Bioprosthetic valve in the pulmonic position.  Mild stenosis in the distal main pulmonary artery.  Echocardiogram completed 2/2020 revealed normal left ventricular function, normal TAPSE with normal right ventricular function, bioprosthetic valve in the pulmonic position with a mean gradient 20 mmHg compared to 18 mmHg previous and mild dilatation of the aortic root. Estimate of RV systolic pressure 36 mmHg plus right atrial pressure.  Holter monitor demonstrated occasional atrial premature beats and short runs of atrial tachycardia and moderate flow tract PVCs without complexity.  We will continue to monitor.    2.  Hypokalemia.  She does require potassium supplementation despite use of spironolactone.  Potassiums have been stable with most recent potassium of 4.5.  He does tell me that she has an upcoming visit with endocrinology.  I did not make changes in her medication regimen today but did ask her to be in touch with me after she sees endocrinology.      Plan: Follow-up 6 months.    1. Tetralogy of Fallot s/p repair     2. Morbid obesity (H)     3. Hypokalemia     4. Chronic diastolic heart failure (H)           An After Visit Summary was printed and given to the patient.    Subjective:    Alma Rosa Mak is a 60 y.o. female who returned for a planned  follow up visit. She has a history of tetralogy of fallot initial operation at  age 7 and subsequent procedure at the [facility] 2010 including tricuspid valve repair, closure of VSD, right-sided Maze procedure and porcine pulmonic valve placed at that time.  She does have a history of premature ventricular complexes with the most recent Holter monitor completed October 2020 and reviewed.    She reports that she has been feeling well.  She specifically denies chest pain, shortness of breath, dizziness or lightheadedness.  She has not had significant palpitation.  She is back to walking regularly and is upwards of 1 to 2 miles at least 1 time per day.    She has noted she has a history of tetralogy of Fallot with surgery at age 7 and subsequent procedure at the [facility] in 2010 which included tricuspid valve repair, VSD, right-sided Maze procedure and porcine pulmonic valve placed.    Review of Systems:                                              Problem List:    Patient Active Problem List   Diagnosis   ? Palpitations   ? Hypokalemia   ? Atrial Premature Complex   ? PVC's (premature ventricular contractions)   ? Tetralogy of Fallot   ? Status post total left knee replacement using cement   ? Essential hypertension   ? Tetralogy of Fallot s/p repair   ? Status post revision of total knee replacement, left   ? Chronic diastolic heart failure (H)   ? Chronic pain syndrome   ? Status post total knee replacement, right   ? Morbid obesity (H)       Social History     Socioeconomic History   ? Marital status:      Spouse name: Not on file   ? Number of children: Not on file   ? Years of education: Not on file   ? Highest education level: Not on file   Occupational History   ? Not on file   Social Needs   ? Financial resource strain: Not on file   ? Food insecurity     Worry: Not on file     Inability: Not on file   ? Transportation needs     Medical: Not on file     Non-medical: Not on file   Tobacco Use   ? Smoking status: Never Smoker   ? Smokeless tobacco: Never Used   Substance and  Sexual Activity   ? Alcohol use: Yes     Comment: rare   ? Drug use: Yes     Types: Marijuana     Comment: Medical marijuana started 9/19   ? Sexual activity: Not on file   Lifestyle   ? Physical activity     Days per week: Not on file     Minutes per session: Not on file   ? Stress: Not on file   Relationships   ? Social connections     Talks on phone: Not on file     Gets together: Not on file     Attends Gnosticist service: Not on file     Active member of club or organization: Not on file     Attends meetings of clubs or organizations: Not on file     Relationship status: Not on file   ? Intimate partner violence     Fear of current or ex partner: Not on file     Emotionally abused: Not on file     Physically abused: Not on file     Forced sexual activity: Not on file   Other Topics Concern   ? Not on file   Social History Narrative   ? Not on file       Family History   Problem Relation Age of Onset   ? Heart disease Father    ? Mental illness Sister        Current Outpatient Medications   Medication Sig Dispense Refill   ? acetaminophen (TYLENOL) 500 MG tablet Take 1,000 mg by mouth every 6 (six) hours as needed for pain.     ? albuterol (PROVENTIL HFA;VENTOLIN HFA) 90 mcg/actuation inhaler Inhale 2 puffs every 4 (four) hours as needed for wheezing or shortness of breath.      ? amoxicillin (AMOXIL) 500 MG capsule Take 2,000 mg by mouth as needed (prior to dental procedures).     ? CALCIUM CARBONATE/VITAMIN D3 (CALCIUM 600 + D,3, ORAL) Take 1 tablet by mouth 2 (two) times a day.     ? cholecalciferol, vitamin D3, 1,000 unit tablet Take 1,000 Units by mouth every evening.      ? cyanocobalamin 500 MCG tablet Take 500 mcg by mouth daily.      ? cyclobenzaprine (FLEXERIL) 10 MG tablet Take 10 mg by mouth 3 (three) times a day as needed for muscle spasms.     ? DULoxetine (CYMBALTA) 30 MG capsule Take 60 mg by mouth every morning.      ? DULoxetine (CYMBALTA) 30 MG capsule Take 30 mg by mouth at bedtime.     ?  "hydroCHLOROthiazide (HYDRODIURIL) 12.5 MG tablet Take 1 tablet (12.5 mg total) by mouth daily. 90 tablet 3   ? HYDROcodone-acetaminophen 5-325 mg per tablet TAKE 1 2 TABS EVERY 6 8 HOURS AS NEEDED FOR CHRONIC PAIN. MAX 3/DAY.     ? levothyroxine (SYNTHROID, LEVOTHROID) 175 MCG tablet Take 175 mcg by mouth daily.     ? losartan (COZAAR) 50 MG tablet Take 50 mg by mouth 2 (two) times a day.      ? medical cannabis (Patient's own supply) 1 Dose by Other route see administration instructions. (The purpose of this order is to document that the patient reports taking medical cannabis. This is not a prescription, and is not used to certify that the patient has a  qualifying medical condition.)     ? multivitamin therapeutic (THERAGRAN) tablet Take 1 tablet by mouth 2 (two) times a day.     ? NON FORMULARY CPaP   Use as directed     ? omeprazole (PRILOSEC) 20 MG capsule Take 20 mg by mouth daily.     ? potassium chloride SA (K-DUR,KLOR-CON) 20 MEQ tablet Take 20 mEq by mouth 4 (four) times a day.     ? spironolactone (ALDACTONE) 25 MG tablet Take 25 mg by mouth every evening.     ? docusate sodium (COLACE) 100 MG capsule Take 1 capsule (100 mg total) by mouth 2 (two) times a day as needed for constipation.  0     No current facility-administered medications for this visit.        Objective:     /70 (Patient Site: Right Arm, Patient Position: Sitting, Cuff Size: Adult Large)   Pulse 64   Resp 16   Ht 5' 4\" (1.626 m)   Wt (!) 248 lb 3.2 oz (112.6 kg)   LMP 06/27/2016   BMI 42.60 kg/m    (!) 248 lb 3.2 oz (112.6 kg)   [unfilled]  Wt Readings from Last 3 Encounters:   04/23/21 (!) 248 lb 3.2 oz (112.6 kg)   10/15/20 (!) 234 lb (106.1 kg)   09/18/20 (!) 234 lb 6.4 oz (106.3 kg)       Physical Exam:    GENERAL APPEARANCE: alert, no apparent distress  HEENT: no scleral icterus or xanthelasma  NECK: jugular venous pressure mildly elevated.  CHEST: symmetric, the lungs are clear to auscultation  CARDIOVASCULAR: " regular rhythm with 3/6 systolic murmur left upper sternal border no carotid bruits  Abdomen: No Organomegaly, masses, bruits, or tenderness. Bowels sounds are present      EXTREMITIES: no cyanosis, clubbing trace edema    Cardiac Testing:  Echo Complete  Order# [id number]  Reading physician: Mona Caba MD Ordering physician: Johnny Yancey MD Study date: 10/15/20   Performing Date Performing Department   Oct 15, 2020  CARDIAC TESTING [[id number]]   Patient Information    Patient Name   Alma Rosa Mak MRN   [id number] Sex   Female  1   1960 (60 y.o.)   Indications    Dx: Benign essential hypertension [I10 (ICD-10-CM)]; Tetralogy of Fallot [Q21.3 (ICD-10-CM)]   Summary      When compared to the previous study dated 2019, no significant change.    Left ventricle ejection fraction is normal. The calculated left ventricular ejection fraction is 57%.    Normal left ventricular size and systolic function.    Grade II (moderate) left ventricular diastolic dysfunction.    The right ventricle is mildly dilated. TAPSE is normal, which is consistent with normal right ventricular systolic function.    Left atrial volume is moderately increased.    Pulmonic Valve: There is a bioprosthetic prosthetic valve present. Mean gradient of 20 mmHg across the pulmonic valve prosthesis which is similar to prior study done on 2019 with a mean gradient of 18 mmHg. Trace paravalvular leak.    The aortic root is mildly dilated.     HOLTER MONITOR     INTERPRETATION DATE:  10/16/2020     TEST DATE:  10/15/2020     INTERPRETATION:  Predominant rhythm is sinus rhythm with rates ranging from 66 beats  per minute to 152 beats per minute.  There was no significant bradycardia or pauses.   A nonspecific IVCD is present throughout the monitoring period.  Occasional atrial  premature beats were noted, 899.  There were several 3 to 5 beat runs of primary  atrial tachycardia, average rate of approximately 150 beats per  minute.  A moderate  number of ventricular premature beats were noted 1522 or 1% of all heartbeats.   There was some ventricular trigeminy present.  There were 32 couplets and 4  triplets.  The predominant PVC morphology had a right bundle configuration and  inferior axis consistent with benign ventricular ectopy from the outflow tract.  No  symptoms were described in the diary.     CONCLUSION:  Occasional atrial premature beats and short runs of atrial tachycardia  noted.  A moderate number of outflow tract PVCs were also noted.        LORENZO HOROWITZ MD  tn  D 10/16/2020 14:37:04  T 10/16/2020 15:24:15  R 10/16/2020 15:24:15  [id number]    Lab Results:    Lab Results   Component Value Date     04/20/2021    K 4.5 04/20/2021     04/20/2021    CO2 27 04/20/2021    BUN 12 04/20/2021    CREATININE 0.69 04/20/2021    CALCIUM 9.4 04/20/2021     Lab Results   Component Value Date    CHOL 157 06/17/2019    TRIG 95 06/17/2019    HDL 57 06/17/2019     BNP (pg/mL)   Date Value   09/11/2018 52   05/23/2016 88 (H)   08/17/2011 54     Creatinine (mg/dL)   Date Value   04/20/2021 0.69   01/15/2021 0.63   10/15/2020 0.65   08/25/2020 0.66     LDL Calculated (mg/dL)   Date Value   06/17/2019 81   12/07/2018 76   08/24/2017 77     Lab Results   Component Value Date    WBC 9.0 02/27/2020    WBC 11.7 (H) 07/22/2014    HGB 12.6 02/28/2020    HCT 45.0 02/27/2020    MCV 89 02/27/2020     02/27/2020         This note has been dictated using voice recognition software. Any grammatical or context distortions are unintentional and inherent to the software.

## 2021-07-03 NOTE — ADDENDUM NOTE
Addendum Note by Phoenix Du MD at 10/29/2019  5:05 PM     Author: Phoenix Du MD Service: -- Author Type: Physician    Filed: 10/29/2019  5:05 PM Date of Service: 10/29/2019  5:05 PM Status: Signed    : Phoenix Du MD (Physician)       Addendum  created 10/29/19 1705 by Phoenix Du MD    Order list changed, Order sets accessed

## 2021-07-13 ENCOUNTER — RECORDS - HEALTHEAST (OUTPATIENT)
Dept: ADMINISTRATIVE | Facility: CLINIC | Age: 61
End: 2021-07-13

## 2021-07-15 ENCOUNTER — LAB REQUISITION (OUTPATIENT)
Dept: LAB | Facility: CLINIC | Age: 61
End: 2021-07-15
Payer: COMMERCIAL

## 2021-07-15 DIAGNOSIS — E87.6 HYPOKALEMIA: ICD-10-CM

## 2021-07-15 DIAGNOSIS — D50.9 IRON DEFICIENCY ANEMIA, UNSPECIFIED: ICD-10-CM

## 2021-07-15 DIAGNOSIS — E03.9 HYPOTHYROIDISM, UNSPECIFIED: ICD-10-CM

## 2021-07-15 LAB
ANION GAP SERPL CALCULATED.3IONS-SCNC: 10 MMOL/L (ref 5–18)
BUN SERPL-MCNC: 12 MG/DL (ref 8–22)
CALCIUM SERPL-MCNC: 9.2 MG/DL (ref 8.5–10.5)
CHLORIDE BLD-SCNC: 106 MMOL/L (ref 98–107)
CO2 SERPL-SCNC: 28 MMOL/L (ref 22–31)
CREAT SERPL-MCNC: 0.65 MG/DL (ref 0.6–1.1)
GFR SERPL CREATININE-BSD FRML MDRD: >90 ML/MIN/1.73M2
GLUCOSE BLD-MCNC: 89 MG/DL (ref 70–125)
IRON SERPL-MCNC: 66 UG/DL (ref 42–175)
POTASSIUM BLD-SCNC: 4.3 MMOL/L (ref 3.5–5)
SODIUM SERPL-SCNC: 144 MMOL/L (ref 136–145)
T4 FREE SERPL-MCNC: 1.24 NG/DL (ref 0.7–1.8)
TSH SERPL DL<=0.005 MIU/L-ACNC: 0.16 UIU/ML (ref 0.3–5)

## 2021-07-15 PROCEDURE — 83540 ASSAY OF IRON: CPT | Performed by: FAMILY MEDICINE

## 2021-07-15 PROCEDURE — 80048 BASIC METABOLIC PNL TOTAL CA: CPT | Mod: ORL | Performed by: FAMILY MEDICINE

## 2021-07-15 PROCEDURE — 84443 ASSAY THYROID STIM HORMONE: CPT | Mod: ORL | Performed by: FAMILY MEDICINE

## 2021-07-15 PROCEDURE — 84439 ASSAY OF FREE THYROXINE: CPT | Mod: ORL | Performed by: FAMILY MEDICINE

## 2021-07-21 ENCOUNTER — RECORDS - HEALTHEAST (OUTPATIENT)
Dept: ADMINISTRATIVE | Facility: CLINIC | Age: 61
End: 2021-07-21

## 2021-08-31 ENCOUNTER — LAB REQUISITION (OUTPATIENT)
Dept: LAB | Facility: CLINIC | Age: 61
End: 2021-08-31

## 2021-08-31 DIAGNOSIS — E03.9 HYPOTHYROIDISM, UNSPECIFIED: ICD-10-CM

## 2021-08-31 LAB — TSH SERPL DL<=0.005 MIU/L-ACNC: 0.24 UIU/ML (ref 0.3–5)

## 2021-08-31 PROCEDURE — 84443 ASSAY THYROID STIM HORMONE: CPT | Performed by: FAMILY MEDICINE

## 2021-11-29 NOTE — PATIENT INSTRUCTIONS
We are going to plan a heart ultrasound.Please keep me updated regarding surgery.Please ask your primary to draw a bnp which is a measure of fluid overload with upcoming blood work.Let me know if shortness of breath gets worse.My nurse is Josefina and her number is [phone]

## 2021-11-29 NOTE — PROGRESS NOTES
HEART CARE ENCOUNTER CONSULTATON NOTE      [facility]  [phone]      Assessment/Recommendations   Assessment/Plan:  1.  History of Tetralogy of Fallot with prior surgical intervention as described.  Cardiac MRI from August 2019 were LV function was said to be mildly reduced with mild reduction in right ventricular function with a bioprosthetic valve in the pulmonic position.  The most recent echocardiogram is Feb 2020 were LV function was said to be normal on that examination with mild enlargement of the right ventricle and normal function of the right ventricle.  The bioprosthetic valve in the pulmonic position found a mean gradient of 20 mmHg compared to 18 mmHg previous and mild dilatation of the aortic root.  Estimate of RV systolic pressure was 36 mmHg plus right atrial pressure.  We are going to plan follow-up echocardiogram.  Holter monitor demonstrated occasional premature atrial beats and short runs of atrial tachycardia.  She is not had significant palpitation symptoms.    2.  History of hypokalemia.  She subsequently after our last visit did visit with endocrinology and subsequent nephrology.  She does have an adrenal cyst with plans to further evaluate.  I do not have records from nephrology and have requested.    3.  She does endorse mild dyspnea on exertion.  I did discuss that weight is up when compared to the last few years.  Her weight today is 260 pounds compared to 235 pounds April 2020.  She has had prior gastric bypass surgery.  We talked about options to further work on weight reduction.  She would like to do this on her own and we going to keep in touch as to whether she would like a more formal referral to bariatric clinic.    Plan.  Echocardiogram  2.  Would request a BNP with next laboratory studies.  Anticipates possible preop in the next 1 month and will request this through her primary care provider as well as an EKG at that time.  Further comments pending the  results of the updated echocardiogram.  Follow-up 6 months.       History of Present Illness/Subjective    HPI: Alma Rosa Mak is a 61 year old female is seen in follow up.She has a history of tetralogy of fallot initial operation at age 7 and subsequent procedure at the [facility] 2010 including tricuspid valve repair, closure of VSD, right-sided Maze procedure and porcine pulmonic valve placed at that time.  She does have a history of premature ventricular complexes with the most recent Holter monitor completed October 2020 and reviewed.    She notes a little bit more breathlessness when she is walking uphill especially if she is pushing a stroller.  She has not had complaints of chest pain, dizziness or significant palpitation.  She does not have shortness of breath at rest.  She does tell me that she has been evaluated by nephrology Dr. Ludwig and does have an adrenal cyst that is being evaluated.  She historically has required higher doses of potassium.  I do not have notes from his office but have requested.  In addition she tells me that she had a recent CT scan through [facility].    Recent Echocardiogram Results:  Echocardiogram Complete  Order: [id number]   Status: Final result     Visible to patient: Yes (seen)     Next appt: 11/29/2021 at 08:10 AM in Cardiology (Johnny Yancey MD)     Dx: Tetralogy of Fallot; Benign essential...     1 Result Note    Details    Reading Physician Reading Date Result Priority   Mona Caba MD  [phone] 10/15/2020 Routine   Provider, Historical 10/15/2020      Narrative & Impression    When compared to the previous study dated 7/18/2019, no significant change.    Left ventricle ejection fraction is normal. The calculated left ventricular ejection fraction is 57%.    Normal left ventricular size and systolic function.    Grade II (moderate) left ventricular diastolic dysfunction.    The right ventricle is mildly dilated. TAPSE is normal, which is consistent with normal  right ventricular systolic function.    Left atrial volume is moderately increased.    Pulmonic Valve: There is a bioprosthetic prosthetic valve present. Mean gradient of 20 mmHg across the pulmonic valve prosthesis which is similar to prior study done on 7/18/2019 with a mean gradient of 18 mmHg. Trace paravalvular leak.    The aortic root is mildly dilated.         Recent Coronary Angiogram Results:    INTERPRETATION DATE:  10/16/2020     TEST DATE:  10/15/2020     INTERPRETATION:  Predominant rhythm is sinus rhythm with rates ranging from 66 beats  per minute to 152 beats per minute.  There was no significant bradycardia or pauses.   A nonspecific IVCD is present throughout the monitoring period.  Occasional atrial  premature beats were noted, 899.  There were several 3 to 5 beat runs of primary  atrial tachycardia, average rate of approximately 150 beats per minute.  A moderate  number of ventricular premature beats were noted 1522 or 1% of all heartbeats.   There was some ventricular trigeminy present.  There were 32 couplets and 4  triplets.  The predominant PVC morphology had a right bundle configuration and  inferior axis consistent with benign ventricular ectopy from the outflow tract.  No  symptoms were described in the diary.     CONCLUSION:  Occasional atrial premature beats and short runs of atrial tachycardia  noted.  A moderate number of outflow tract PVCs were also noted.        LORENZO HOROWITZ MD  tn  D 10/16/2020 14:37:04  T 10/16/2020 15:24:15  R 10/16/2020 15:24:15  [id number]       Physical Examination  Review of Systems   Vitals: 118/70, heart rate of 72 and regular, weight 260 pounds.  Respiratory rate 16  Wt Readings from Last 3 Encounters:   04/23/21 112.6 kg (248 lb 3.2 oz)   09/18/20 106.3 kg (234 lb 6.4 oz)   04/28/20 106.6 kg (235 lb)       General Appearance:   no distress, normal body habitus   ENT/Mouth: membranes moist, no oral lesions or bleeding gums.      EYES:  no scleral icterus,  normal conjunctivae   Neck: no carotid bruits or thyromegaly   Chest/Lungs:   lungs are clear to auscultation, no rales or wheezing, well-healed sternal scar, equal chest wall expansion    Cardiovascular:   Regular. Normal first and second heart sounds with 3/6 systolic, rubs, or gallops; the carotid, radial and posterior tibial pulses are intact, Jugular venous pressure within normal limits, mild edema bilaterally    Abdomen:  no organomegaly, masses, bruits, or tenderness; bowel sounds are present   Extremities: no cyanosis or clubbing   Skin: no xanthelasma, warm.    Neurologic: normal  bilateral, no tremors     Psychiatric: alert and oriented x3, calm        Please refer above for cardiac ROS details.        Medical History  Surgical History Family History Social History   Past Medical History:   Diagnosis Date     Anemia     daily supplement.  no transfusion hx     Arthritis     OA     CHF (congestive heart failure) (H)     diastolic     Chronic diastolic heart failure (H)      Depression      Disease of thyroid gland      GERD (gastroesophageal reflux disease)      Hay fever      [place] (hard of hearing)      Hypertension      Menorrhagia with irregular cycle      Migraine      PAC (premature atrial contraction)      Polyarthralgia      PONV (postoperative nausea and vomiting)      Sleep apnea     CPAP     Tetralogy of Fallot      Past Surgical History:   Procedure Laterality Date     ARTHROPLASTY REVISION KNEE Left 10/29/2019    Procedure: REVISION TOTAL KNEE ARTHROPLASTY, LEFT TIBIAL REVISON;  Surgeon: Cezar Lockwood DO;  Location: [facility];  Service: Orthopedics     ARTHROSCOPY KNEE       BUNIONECTOMY Bilateral      C REPLACEMENT, PULMONARY VALVE  2010    Description: Pulmonary Valve Replacement;  Recorded: 02/20/2012;     C TOTAL KNEE ARTHROPLASTY Left 10/18/2016    Procedure: LEFT KNEE TOTAL ARTHROPLASTY;  Surgeon: Chucky Grove MD;  Location: [facility];  Service: Orthopedics     C  TOTAL KNEE ARTHROPLASTY Right 2/27/2020    Procedure: RIGHT TOTAL KNEE ARTHROPLASTY;  Surgeon: Cezar Lockwood DO;  Location: [facility] Main OR;  Service: Orthopedics     CHOLECYSTECTOMY       FOOT SURGERY       GASTRIC BYPASS       HYSTERECTOMY       JOINT REPLACEMENT Left     knee and revision     REPLACE VALVE MITRAL  2010     Family History   Problem Relation Age of Onset     Heart Disease Father      Mental Illness Sister         Social History     Socioeconomic History     Marital status:      Spouse name: Not on file     Number of children: Not on file     Years of education: Not on file     Highest education level: Not on file   Occupational History     Not on file   Tobacco Use     Smoking status: Never Smoker     Smokeless tobacco: Never Used   Substance and Sexual Activity     Alcohol use: Yes     Comment: Alcoholic Drinks/day: rare     Drug use: Yes     Types: Marijuana     Comment: Drug use: Medical marijuana started 9/19     Sexual activity: Not on file   Other Topics Concern     Not on file   Social History Narrative     Not on file     Social Determinants of Health     Financial Resource Strain: Not on file   Food Insecurity: Not on file   Transportation Needs: Not on file   Physical Activity: Not on file   Stress: Not on file   Social Connections: Not on file   Intimate Partner Violence: Not on file   Housing Stability: Not on file           Medications  Allergies   Current Outpatient Medications   Medication Sig Dispense Refill     acetaminophen (TYLENOL) 500 MG tablet [ACETAMINOPHEN (TYLENOL) 500 MG TABLET] Take 1,000 mg by mouth every 6 (six) hours as needed for pain.       albuterol (PROVENTIL HFA;VENTOLIN HFA) 90 mcg/actuation inhaler [ALBUTEROL (PROVENTIL HFA;VENTOLIN HFA) 90 MCG/ACTUATION INHALER] Inhale 2 puffs every 4 (four) hours as needed for wheezing or shortness of breath.        amoxicillin (AMOXIL) 500 MG capsule [AMOXICILLIN (AMOXIL) 500 MG CAPSULE] Take 2,000 mg by mouth as  needed (prior to dental procedures).       CALCIUM CARBONATE/VITAMIN D3 (CALCIUM 600 + D,3, ORAL) [CALCIUM CARBONATE/VITAMIN D3 (CALCIUM 600 + D,3, ORAL)] Take 1 tablet by mouth 2 (two) times a day.       cholecalciferol, vitamin D3, 1,000 unit tablet [CHOLECALCIFEROL, VITAMIN D3, 1,000 UNIT TABLET] Take 1,000 Units by mouth every evening.        cyanocobalamin 500 MCG tablet [CYANOCOBALAMIN 500 MCG TABLET] Take 500 mcg by mouth daily.        cyclobenzaprine (FLEXERIL) 10 MG tablet [CYCLOBENZAPRINE (FLEXERIL) 10 MG TABLET] Take 10 mg by mouth 3 (three) times a day as needed for muscle spasms.       docusate sodium (COLACE) 100 MG capsule [DOCUSATE SODIUM (COLACE) 100 MG CAPSULE] Take 1 capsule (100 mg total) by mouth 2 (two) times a day as needed for constipation.  0     DULoxetine (CYMBALTA) 30 MG capsule [DULOXETINE (CYMBALTA) 30 MG CAPSULE] Take 30 mg by mouth at bedtime.       DULoxetine (CYMBALTA) 30 MG capsule [DULOXETINE (CYMBALTA) 30 MG CAPSULE] Take 60 mg by mouth every morning.        hydroCHLOROthiazide (HYDRODIURIL) 12.5 MG tablet [HYDROCHLOROTHIAZIDE (HYDRODIURIL) 12.5 MG TABLET] Take 1 tablet (12.5 mg total) by mouth daily. 90 tablet 3     HYDROcodone-acetaminophen 5-325 mg per tablet [HYDROCODONE-ACETAMINOPHEN 5-325 MG PER TABLET] TAKE 1 2 TABS EVERY 6 8 HOURS AS NEEDED FOR CHRONIC PAIN. MAX 3/DAY.       levothyroxine (SYNTHROID, LEVOTHROID) 175 MCG tablet [LEVOTHYROXINE (SYNTHROID, LEVOTHROID) 175 MCG TABLET] Take 175 mcg by mouth daily.       losartan (COZAAR) 50 MG tablet [LOSARTAN (COZAAR) 50 MG TABLET] Take 50 mg by mouth 2 (two) times a day.        medical cannabis (Patient's own supply) [MEDICAL CANNABIS (PATIENT'S OWN SUPPLY)] 1 Dose by Other route see administration instructions. (The purpose of this order is to document that the patient reports taking medical cannabis. This is not a prescription, and is not used to certify that the patient has a  qualifying medical condition.)        multivitamin therapeutic (THERAGRAN) tablet [MULTIVITAMIN THERAPEUTIC (THERAGRAN) TABLET] Take 1 tablet by mouth 2 (two) times a day.       NON FORMULARY [NON FORMULARY] CPaP   Use as directed       omeprazole (PRILOSEC) 20 MG capsule [OMEPRAZOLE (PRILOSEC) 20 MG CAPSULE] Take 20 mg by mouth daily.       potassium chloride SA (K-DUR,KLOR-CON) 20 MEQ tablet [POTASSIUM CHLORIDE SA (K-DUR,KLOR-CON) 20 MEQ TABLET] Take 20 mEq by mouth 4 (four) times a day.       spironolactone (ALDACTONE) 25 MG tablet [SPIRONOLACTONE (ALDACTONE) 25 MG TABLET] Take 25 mg by mouth every evening.         Allergies   Allergen Reactions     Ibuprofen Itching and Swelling     Throat constriction and urticaria     Nsaids (Non-Steroidal Anti-Inflammatory Drug) [Nsaids] Anaphylaxis     Sulfa (Sulfonamide Antibiotics) [Sulfa Drugs] Rash          Lab Results    Chemistry/lipid CBC Cardiac Enzymes/BNP/TSH/INR   Recent Labs   Lab Test 06/17/19  1539   CHOL 157   HDL 57   LDL 81   TRIG 95     Recent Labs   Lab Test 06/17/19  1539 12/07/18  0741 08/24/17  0804   LDL 81 76 77     Recent Labs   Lab Test 07/15/21  0808      POTASSIUM 4.3   CHLORIDE 106   CO2 28   GLC 89   BUN 12   CR 0.65   GFRESTIMATED >90   ZACK 9.2     Recent Labs   Lab Test 07/15/21  0808 04/20/21  1116 01/15/21  1143   CR 0.65 0.69 0.63     No results for input(s): A1C in the last 49860 hours.       Recent Labs   Lab Test 02/28/20  0511 02/27/20  0630   WBC  --  9.0   HGB 12.6 14.7   HCT  --  45.0   MCV  --  89   PLT  --  228     Recent Labs   Lab Test 02/28/20  0511 02/27/20  0630 10/31/19  0605   HGB 12.6 14.7 11.8*    No results for input(s): TROPONINI in the last 90034 hours.  Recent Labs   Lab Test 09/11/18  1212   BNP 52     Recent Labs   Lab Test 10/11/21  0916   TSH 2.33     Recent Labs   Lab Test 10/29/19  1122   INR 0.93        Johnny Yancey MD

## 2021-11-29 NOTE — LETTER
11/29/2021    Danny Davison MD  [facility] [address]  [facility] [address]    RE: Alma Rosa Mak       Dear Colleague,    I had the pleasure of seeing Alma Rosa Mak in the [facility].    HEART CARE ENCOUNTER CONSULTATON NOTE      [facility]  [phone]      Assessment/Recommendations   Assessment/Plan:  1.  History of Tetralogy of Fallot with prior surgical intervention as described.  Cardiac MRI from August 2019 were LV function was said to be mildly reduced with mild reduction in right ventricular function with a bioprosthetic valve in the pulmonic position.  The most recent echocardiogram is Feb 2020 were LV function was said to be normal on that examination with mild enlargement of the right ventricle and normal function of the right ventricle.  The bioprosthetic valve in the pulmonic position found a mean gradient of 20 mmHg compared to 18 mmHg previous and mild dilatation of the aortic root.  Estimate of RV systolic pressure was 36 mmHg plus right atrial pressure.  We are going to plan follow-up echocardiogram.  Holter monitor demonstrated occasional premature atrial beats and short runs of atrial tachycardia.  She is not had significant palpitation symptoms.    2.  History of hypokalemia.  She subsequently after our last visit did visit with endocrinology and subsequent nephrology.  She does have an adrenal cyst with plans to further evaluate.  I do not have records from nephrology and have requested.    3.  She does endorse mild dyspnea on exertion.  I did discuss that weight is up when compared to the last few years.  Her weight today is 260 pounds compared to 235 pounds April 2020.  She has had prior gastric bypass surgery.  We talked about options to further work on weight reduction.  She would like to do this on her own and we going to keep in touch as to whether she would like a more  formal referral to bariatric clinic.    Plan.  Echocardiogram  2.  Would request a BNP with next laboratory studies.  Anticipates possible preop in the next 1 month and will request this through her primary care provider as well as an EKG at that time.  Further comments pending the results of the updated echocardiogram.  Follow-up 6 months.       History of Present Illness/Subjective    HPI: Alma Rosa Mak is a 61 year old female is seen in follow up.She has a history of tetralogy of fallot initial operation at age 7 and subsequent procedure at the [facility] 2010 including tricuspid valve repair, closure of VSD, right-sided Maze procedure and porcine pulmonic valve placed at that time.  She does have a history of premature ventricular complexes with the most recent Holter monitor completed October 2020 and reviewed.    She notes a little bit more breathlessness when she is walking uphill especially if she is pushing a stroller.  She has not had complaints of chest pain, dizziness or significant palpitation.  She does not have shortness of breath at rest.  She does tell me that she has been evaluated by nephrology Dr. Ludwig and does have an adrenal cyst that is being evaluated.  She historically has required higher doses of potassium.  I do not have notes from his office but have requested.  In addition she tells me that she had a recent CT scan through [facility].    Recent Echocardiogram Results:  Echocardiogram Complete  Order: [id number]   Status: Final result     Visible to patient: Yes (seen)     Next appt: 11/29/2021 at 08:10 AM in Cardiology (Johnny Yancey MD)     Dx: Tetralogy of Fallot; Benign essential...     1 Result Note    Details    Reading Physician Reading Date Result Priority   Mona Caba MD  [phone] 10/15/2020 Routine   Provider, Historical 10/15/2020      Narrative & Impression    When compared to the previous study dated 7/18/2019, no significant change.    Left ventricle ejection  fraction is normal. The calculated left ventricular ejection fraction is 57%.    Normal left ventricular size and systolic function.    Grade II (moderate) left ventricular diastolic dysfunction.    The right ventricle is mildly dilated. TAPSE is normal, which is consistent with normal right ventricular systolic function.    Left atrial volume is moderately increased.    Pulmonic Valve: There is a bioprosthetic prosthetic valve present. Mean gradient of 20 mmHg across the pulmonic valve prosthesis which is similar to prior study done on 7/18/2019 with a mean gradient of 18 mmHg. Trace paravalvular leak.    The aortic root is mildly dilated.         Recent Coronary Angiogram Results:    INTERPRETATION DATE:  10/16/2020     TEST DATE:  10/15/2020     INTERPRETATION:  Predominant rhythm is sinus rhythm with rates ranging from 66 beats  per minute to 152 beats per minute.  There was no significant bradycardia or pauses.   A nonspecific IVCD is present throughout the monitoring period.  Occasional atrial  premature beats were noted, 899.  There were several 3 to 5 beat runs of primary  atrial tachycardia, average rate of approximately 150 beats per minute.  A moderate  number of ventricular premature beats were noted 1522 or 1% of all heartbeats.   There was some ventricular trigeminy present.  There were 32 couplets and 4  triplets.  The predominant PVC morphology had a right bundle configuration and  inferior axis consistent with benign ventricular ectopy from the outflow tract.  No  symptoms were described in the diary.     CONCLUSION:  Occasional atrial premature beats and short runs of atrial tachycardia  noted.  A moderate number of outflow tract PVCs were also noted.        LORENZO HOROWITZ MD  tn  D 10/16/2020 14:37:04  T 10/16/2020 15:24:15  R 10/16/2020 15:24:15  [id number]       Physical Examination  Review of Systems   Vitals: 118/70, heart rate of 72 and regular, weight 260 pounds.  Respiratory rate 16  Wt  Readings from Last 3 Encounters:   04/23/21 112.6 kg (248 lb 3.2 oz)   09/18/20 106.3 kg (234 lb 6.4 oz)   04/28/20 106.6 kg (235 lb)       General Appearance:   no distress, normal body habitus   ENT/Mouth: membranes moist, no oral lesions or bleeding gums.      EYES:  no scleral icterus, normal conjunctivae   Neck: no carotid bruits or thyromegaly   Chest/Lungs:   lungs are clear to auscultation, no rales or wheezing, well-healed sternal scar, equal chest wall expansion    Cardiovascular:   Regular. Normal first and second heart sounds with 3/6 systolic, rubs, or gallops; the carotid, radial and posterior tibial pulses are intact, Jugular venous pressure within normal limits, mild edema bilaterally    Abdomen:  no organomegaly, masses, bruits, or tenderness; bowel sounds are present   Extremities: no cyanosis or clubbing   Skin: no xanthelasma, warm.    Neurologic: normal  bilateral, no tremors     Psychiatric: alert and oriented x3, calm        Please refer above for cardiac ROS details.        Medical History  Surgical History Family History Social History   Past Medical History:   Diagnosis Date     Anemia     daily supplement.  no transfusion hx     Arthritis     OA     CHF (congestive heart failure) (H)     diastolic     Chronic diastolic heart failure (H)      Depression      Disease of thyroid gland      GERD (gastroesophageal reflux disease)      Hay fever      [place] (hard of hearing)      Hypertension      Menorrhagia with irregular cycle      Migraine      PAC (premature atrial contraction)      Polyarthralgia      PONV (postoperative nausea and vomiting)      Sleep apnea     CPAP     Tetralogy of Fallot      Past Surgical History:   Procedure Laterality Date     ARTHROPLASTY REVISION KNEE Left 10/29/2019    Procedure: REVISION TOTAL KNEE ARTHROPLASTY, LEFT TIBIAL REVISON;  Surgeon: Cezar Lockwood DO;  Location: [facility];  Service: Orthopedics     ARTHROSCOPY KNEE       BUNIONECTOMY Bilateral       C REPLACEMENT, PULMONARY VALVE  2010    Description: Pulmonary Valve Replacement;  Recorded: 02/20/2012;     C TOTAL KNEE ARTHROPLASTY Left 10/18/2016    Procedure: LEFT KNEE TOTAL ARTHROPLASTY;  Surgeon: Chucky Grove MD;  Location: [facility] Main OR;  Service: Orthopedics     C TOTAL KNEE ARTHROPLASTY Right 2/27/2020    Procedure: RIGHT TOTAL KNEE ARTHROPLASTY;  Surgeon: Cezar Lockwood DO;  Location: [facility] OR;  Service: Orthopedics     CHOLECYSTECTOMY       FOOT SURGERY       GASTRIC BYPASS       HYSTERECTOMY       JOINT REPLACEMENT Left     knee and revision     REPLACE VALVE MITRAL  2010     Family History   Problem Relation Age of Onset     Heart Disease Father      Mental Illness Sister         Social History     Socioeconomic History     Marital status:      Spouse name: Not on file     Number of children: Not on file     Years of education: Not on file     Highest education level: Not on file   Occupational History     Not on file   Tobacco Use     Smoking status: Never Smoker     Smokeless tobacco: Never Used   Substance and Sexual Activity     Alcohol use: Yes     Comment: Alcoholic Drinks/day: rare     Drug use: Yes     Types: Marijuana     Comment: Drug use: Medical marijuana started 9/19     Sexual activity: Not on file   Other Topics Concern     Not on file   Social History Narrative     Not on file     Social Determinants of Health     Financial Resource Strain: Not on file   Food Insecurity: Not on file   Transportation Needs: Not on file   Physical Activity: Not on file   Stress: Not on file   Social Connections: Not on file   Intimate Partner Violence: Not on file   Housing Stability: Not on file           Medications  Allergies   Current Outpatient Medications   Medication Sig Dispense Refill     acetaminophen (TYLENOL) 500 MG tablet [ACETAMINOPHEN (TYLENOL) 500 MG TABLET] Take 1,000 mg by mouth every 6 (six) hours as needed for pain.       albuterol (PROVENTIL HFA;VENTOLIN  HFA) 90 mcg/actuation inhaler [ALBUTEROL (PROVENTIL HFA;VENTOLIN HFA) 90 MCG/ACTUATION INHALER] Inhale 2 puffs every 4 (four) hours as needed for wheezing or shortness of breath.        amoxicillin (AMOXIL) 500 MG capsule [AMOXICILLIN (AMOXIL) 500 MG CAPSULE] Take 2,000 mg by mouth as needed (prior to dental procedures).       CALCIUM CARBONATE/VITAMIN D3 (CALCIUM 600 + D,3, ORAL) [CALCIUM CARBONATE/VITAMIN D3 (CALCIUM 600 + D,3, ORAL)] Take 1 tablet by mouth 2 (two) times a day.       cholecalciferol, vitamin D3, 1,000 unit tablet [CHOLECALCIFEROL, VITAMIN D3, 1,000 UNIT TABLET] Take 1,000 Units by mouth every evening.        cyanocobalamin 500 MCG tablet [CYANOCOBALAMIN 500 MCG TABLET] Take 500 mcg by mouth daily.        cyclobenzaprine (FLEXERIL) 10 MG tablet [CYCLOBENZAPRINE (FLEXERIL) 10 MG TABLET] Take 10 mg by mouth 3 (three) times a day as needed for muscle spasms.       docusate sodium (COLACE) 100 MG capsule [DOCUSATE SODIUM (COLACE) 100 MG CAPSULE] Take 1 capsule (100 mg total) by mouth 2 (two) times a day as needed for constipation.  0     DULoxetine (CYMBALTA) 30 MG capsule [DULOXETINE (CYMBALTA) 30 MG CAPSULE] Take 30 mg by mouth at bedtime.       DULoxetine (CYMBALTA) 30 MG capsule [DULOXETINE (CYMBALTA) 30 MG CAPSULE] Take 60 mg by mouth every morning.        hydroCHLOROthiazide (HYDRODIURIL) 12.5 MG tablet [HYDROCHLOROTHIAZIDE (HYDRODIURIL) 12.5 MG TABLET] Take 1 tablet (12.5 mg total) by mouth daily. 90 tablet 3     HYDROcodone-acetaminophen 5-325 mg per tablet [HYDROCODONE-ACETAMINOPHEN 5-325 MG PER TABLET] TAKE 1 2 TABS EVERY 6 8 HOURS AS NEEDED FOR CHRONIC PAIN. MAX 3/DAY.       levothyroxine (SYNTHROID, LEVOTHROID) 175 MCG tablet [LEVOTHYROXINE (SYNTHROID, LEVOTHROID) 175 MCG TABLET] Take 175 mcg by mouth daily.       losartan (COZAAR) 50 MG tablet [LOSARTAN (COZAAR) 50 MG TABLET] Take 50 mg by mouth 2 (two) times a day.        medical cannabis (Patient's own supply) [MEDICAL CANNABIS  (PATIENT'S OWN SUPPLY)] 1 Dose by Other route see administration instructions. (The purpose of this order is to document that the patient reports taking medical cannabis. This is not a prescription, and is not used to certify that the patient has a  qualifying medical condition.)       multivitamin therapeutic (THERAGRAN) tablet [MULTIVITAMIN THERAPEUTIC (THERAGRAN) TABLET] Take 1 tablet by mouth 2 (two) times a day.       NON FORMULARY [NON FORMULARY] CPaP   Use as directed       omeprazole (PRILOSEC) 20 MG capsule [OMEPRAZOLE (PRILOSEC) 20 MG CAPSULE] Take 20 mg by mouth daily.       potassium chloride SA (K-DUR,KLOR-CON) 20 MEQ tablet [POTASSIUM CHLORIDE SA (K-DUR,KLOR-CON) 20 MEQ TABLET] Take 20 mEq by mouth 4 (four) times a day.       spironolactone (ALDACTONE) 25 MG tablet [SPIRONOLACTONE (ALDACTONE) 25 MG TABLET] Take 25 mg by mouth every evening.         Allergies   Allergen Reactions     Ibuprofen Itching and Swelling     Throat constriction and urticaria     Nsaids (Non-Steroidal Anti-Inflammatory Drug) [Nsaids] Anaphylaxis     Sulfa (Sulfonamide Antibiotics) [Sulfa Drugs] Rash          Lab Results    Chemistry/lipid CBC Cardiac Enzymes/BNP/TSH/INR   Recent Labs   Lab Test 06/17/19  1539   CHOL 157   HDL 57   LDL 81   TRIG 95     Recent Labs   Lab Test 06/17/19  1539 12/07/18  0741 08/24/17  0804   LDL 81 76 77     Recent Labs   Lab Test 07/15/21  0808      POTASSIUM 4.3   CHLORIDE 106   CO2 28   GLC 89   BUN 12   CR 0.65   GFRESTIMATED >90   ZACK 9.2     Recent Labs   Lab Test 07/15/21  0808 04/20/21  1116 01/15/21  1143   CR 0.65 0.69 0.63     No results for input(s): A1C in the last 48739 hours.       Recent Labs   Lab Test 02/28/20  0511 02/27/20  0630   WBC  --  9.0   HGB 12.6 14.7   HCT  --  45.0   MCV  --  89   PLT  --  228     Recent Labs   Lab Test 02/28/20  0511 02/27/20  0630 10/31/19  0605   HGB 12.6 14.7 11.8*    No results for input(s): TROPONINI in the last 57877 hours.  Recent Labs    Lab Test 09/11/18  1212   BNP 52     Recent Labs   Lab Test 10/11/21  0916   TSH 2.33     Recent Labs   Lab Test 10/29/19  1122   INR 0.93          Johnny Yancey MD  [facility]

## 2021-12-21 NOTE — TELEPHONE ENCOUNTER
----- Message from Johnny Yancey MD sent at 12/21/2021  6:36 AM CST -----  This is the patient that I wanted a pro bnp this week. Thanks.      From: Johnny Yancey MD   Sent: 12/20/2021   4:05 PM CST   To: Josefina Hernandez RN     I have asked patient to have a BNP or proBNP that would be acceptable.  She was going to do this through her primary care provider but she go back saying that this may not be possible.  I requested it before an upcoming surgery January 5.  Therefore can we schedule a proBNP to be completed in the near future.  Thank you mdg

## 2021-12-21 NOTE — TELEPHONE ENCOUNTER
Confirmed order placed - sent msg to sched team with request to arrange lab appt and update nurse if patient plans to have done at PCP clinic.  mg

## 2021-12-25 NOTE — TELEPHONE ENCOUNTER
Pt LVM reporting she is feeling well, except for her broken toe, and asking for note from Dr Yancey to be faxed to [place], Dr Johnny Plummer fax [phone].    Note faxed per pt request.  -[place]   If you are a smoker, it is important for your health to stop smoking. Please be aware that second hand smoke is also harmful.

## 2021-12-27 NOTE — PROGRESS NOTES
===View-only below this line===  ----- Message -----  From: Johnny Yancey MD  Sent: 12/23/2021  12:51 PM CST  To: Josefina Hernandez RN    We sent a note indicating that from a cardiovascular standpoint she is cleared to proceed with the surgery.  She is having her adrenal gland removed.  She will require close perioperative and postoperative monitoring given her history of Tetralogy of Fallot but recent echocardiogram and BnP looks stable.  Thank you mdg

## 2022-01-01 ENCOUNTER — LAB REQUISITION (OUTPATIENT)
Dept: LAB | Facility: CLINIC | Age: 62
End: 2022-01-01

## 2022-01-01 ENCOUNTER — OFFICE VISIT (OUTPATIENT)
Dept: CARDIOLOGY | Facility: CLINIC | Age: 62
End: 2022-01-01
Payer: COMMERCIAL

## 2022-01-01 ENCOUNTER — HOSPITAL ENCOUNTER (OUTPATIENT)
Dept: CARDIOLOGY | Facility: CLINIC | Age: 62
Discharge: HOME | End: 2022-05-20
Attending: INTERNAL MEDICINE | Admitting: INTERNAL MEDICINE
Payer: COMMERCIAL

## 2022-01-01 ENCOUNTER — TELEPHONE (OUTPATIENT)
Dept: CARDIOLOGY | Facility: CLINIC | Age: 62
End: 2022-01-01

## 2022-01-01 ENCOUNTER — LAB REQUISITION (OUTPATIENT)
Dept: LAB | Facility: CLINIC | Age: 62
End: 2022-01-01
Payer: COMMERCIAL

## 2022-01-01 ENCOUNTER — APPOINTMENT (OUTPATIENT)
Dept: GENERAL RADIOLOGY | Facility: CLINIC | Age: 62
End: 2022-01-01
Attending: EMERGENCY MEDICINE
Payer: COMMERCIAL

## 2022-01-01 ENCOUNTER — APPOINTMENT (OUTPATIENT)
Dept: CT IMAGING | Facility: CLINIC | Age: 62
End: 2022-01-01
Attending: EMERGENCY MEDICINE
Payer: COMMERCIAL

## 2022-01-01 ENCOUNTER — HEALTH MAINTENANCE LETTER (OUTPATIENT)
Age: 62
End: 2022-01-01

## 2022-01-01 ENCOUNTER — HOSPITAL ENCOUNTER (INPATIENT)
Facility: CLINIC | Age: 62
LOS: 1 days | End: 2022-09-16
Attending: EMERGENCY MEDICINE
Payer: COMMERCIAL

## 2022-01-01 VITALS
DIASTOLIC BLOOD PRESSURE: 70 MMHG | HEART RATE: 73 BPM | WEIGHT: 262.1 LBS | RESPIRATION RATE: 12 BRPM | SYSTOLIC BLOOD PRESSURE: 115 MMHG | BODY MASS INDEX: 44.99 KG/M2

## 2022-01-01 VITALS
OXYGEN SATURATION: 90 % | DIASTOLIC BLOOD PRESSURE: 46 MMHG | RESPIRATION RATE: 16 BRPM | HEART RATE: 163 BPM | SYSTOLIC BLOOD PRESSURE: 71 MMHG | WEIGHT: 241.62 LBS | BODY MASS INDEX: 41.25 KG/M2 | TEMPERATURE: 92.5 F | HEIGHT: 64 IN

## 2022-01-01 DIAGNOSIS — M13.0 POLYARTHRITIS, UNSPECIFIED: ICD-10-CM

## 2022-01-01 DIAGNOSIS — K72.00 SHOCK LIVER: ICD-10-CM

## 2022-01-01 DIAGNOSIS — I46.9 CARDIAC ARREST (H): ICD-10-CM

## 2022-01-01 DIAGNOSIS — Z87.74 H/O TETRALOGY OF FALLOT REPAIR: ICD-10-CM

## 2022-01-01 DIAGNOSIS — Z03.818 ENCOUNTER FOR OBSERVATION FOR SUSPECTED EXPOSURE TO OTHER BIOLOGICAL AGENTS RULED OUT: ICD-10-CM

## 2022-01-01 DIAGNOSIS — E26.01 CONN'S SYNDROME (H): ICD-10-CM

## 2022-01-01 DIAGNOSIS — J69.0 ASPIRATION PNEUMONITIS (H): ICD-10-CM

## 2022-01-01 DIAGNOSIS — J81.0 ACUTE PULMONARY EDEMA (H): ICD-10-CM

## 2022-01-01 DIAGNOSIS — Q21.3 TETRALOGY OF FALLOT: ICD-10-CM

## 2022-01-01 DIAGNOSIS — E03.9 HYPOTHYROIDISM, UNSPECIFIED: ICD-10-CM

## 2022-01-01 DIAGNOSIS — Z01.812 ENCOUNTER FOR PREPROCEDURAL LABORATORY EXAMINATION: ICD-10-CM

## 2022-01-01 DIAGNOSIS — R53.83 OTHER FATIGUE: ICD-10-CM

## 2022-01-01 DIAGNOSIS — S22.43XA CLOSED FRACTURE OF MULTIPLE RIBS OF BOTH SIDES, INITIAL ENCOUNTER: ICD-10-CM

## 2022-01-01 DIAGNOSIS — J93.9 BILATERAL PNEUMOTHORACES: ICD-10-CM

## 2022-01-01 LAB
ALBUMIN SERPL-MCNC: 3 G/DL (ref 3.4–5)
ALBUMIN UR-MCNC: 200 MG/DL
ALP SERPL-CCNC: 112 U/L (ref 40–150)
ALT SERPL W P-5'-P-CCNC: 442 U/L (ref 0–50)
ANA SER QL IF: NEGATIVE
ANION GAP SERPL CALCULATED.3IONS-SCNC: 11 MMOL/L (ref 5–18)
ANION GAP SERPL CALCULATED.3IONS-SCNC: 13 MMOL/L (ref 3–14)
ANION GAP SERPL CALCULATED.3IONS-SCNC: 14 MMOL/L (ref 3–14)
ANION GAP SERPL CALCULATED.3IONS-SCNC: 17 MMOL/L (ref 3–14)
ANION GAP SERPL CALCULATED.3IONS-SCNC: 20 MMOL/L (ref 3–14)
ANION GAP SERPL CALCULATED.3IONS-SCNC: 9 MMOL/L (ref 5–18)
APPEARANCE UR: ABNORMAL
APTT PPP: 30 SECONDS (ref 22–38)
APTT PPP: 43 SECONDS (ref 22–38)
AST SERPL W P-5'-P-CCNC: 533 U/L (ref 0–45)
ATRIAL RATE - MUSE: 104 BPM
BACTERIA #/AREA URNS HPF: ABNORMAL /HPF
BASE EXCESS BLDV CALC-SCNC: -11.5 MMOL/L (ref -7.7–1.9)
BASOPHILS # BLD AUTO: 0.1 10E3/UL (ref 0–0.2)
BASOPHILS # BLD MANUAL: 0 10E3/UL (ref 0–0.2)
BASOPHILS NFR BLD AUTO: 0 %
BASOPHILS NFR BLD MANUAL: 0 %
BILIRUB DIRECT SERPL-MCNC: <0.1 MG/DL (ref 0–0.2)
BILIRUB SERPL-MCNC: 0.2 MG/DL (ref 0.2–1.3)
BILIRUB UR QL STRIP: NEGATIVE
BUN SERPL-MCNC: 12 MG/DL (ref 8–22)
BUN SERPL-MCNC: 14 MG/DL (ref 8–22)
BUN SERPL-MCNC: 16 MG/DL (ref 7–30)
CALCIUM SERPL-MCNC: 9 MG/DL (ref 8.5–10.1)
CALCIUM SERPL-MCNC: 9.1 MG/DL (ref 8.5–10.5)
CALCIUM SERPL-MCNC: 9.6 MG/DL (ref 8.5–10.5)
CHLORIDE BLD-SCNC: 101 MMOL/L (ref 98–107)
CHLORIDE BLD-SCNC: 103 MMOL/L (ref 98–107)
CHLORIDE BLD-SCNC: 104 MMOL/L (ref 94–109)
CHLORIDE BLD-SCNC: 111 MMOL/L (ref 94–109)
CHLORIDE BLD-SCNC: 113 MMOL/L (ref 94–109)
CHLORIDE BLD-SCNC: 113 MMOL/L (ref 94–109)
CO2 SERPL-SCNC: 14 MMOL/L (ref 20–32)
CO2 SERPL-SCNC: 18 MMOL/L (ref 20–32)
CO2 SERPL-SCNC: 19 MMOL/L (ref 20–32)
CO2 SERPL-SCNC: 21 MMOL/L (ref 20–32)
CO2 SERPL-SCNC: 27 MMOL/L (ref 22–31)
CO2 SERPL-SCNC: 29 MMOL/L (ref 22–31)
COHGB MFR BLD: 80 % (ref 92–100)
COHGB MFR BLD: 92 % (ref 92–100)
COHGB MFR BLD: 95 % (ref 92–100)
COLOR UR AUTO: YELLOW
CPB POCT: NO
CPB POCT: NO
CREAT SERPL-MCNC: 0.66 MG/DL (ref 0.6–1.1)
CREAT SERPL-MCNC: 0.68 MG/DL (ref 0.6–1.1)
CREAT SERPL-MCNC: 0.81 MG/DL (ref 0.52–1.04)
CRP SERPL-MCNC: <2.9 MG/L (ref 0–8)
DIASTOLIC BLOOD PRESSURE - MUSE: NORMAL MMHG
EOSINOPHIL # BLD AUTO: 0 10E3/UL (ref 0–0.7)
EOSINOPHIL # BLD MANUAL: 0.1 10E3/UL (ref 0–0.7)
EOSINOPHIL NFR BLD AUTO: 0 %
EOSINOPHIL NFR BLD MANUAL: 1 %
ERYTHROCYTE [DISTWIDTH] IN BLOOD BY AUTOMATED COUNT: 15.9 % (ref 10–15)
ERYTHROCYTE [DISTWIDTH] IN BLOOD BY AUTOMATED COUNT: 16.4 % (ref 10–15)
ERYTHROCYTE [SEDIMENTATION RATE] IN BLOOD BY WESTERGREN METHOD: 19 MM/HR (ref 0–20)
ERYTHROCYTE [SEDIMENTATION RATE] IN BLOOD BY WESTERGREN METHOD: 8 MM/HR (ref 0–30)
FIBRINOGEN PPP-MCNC: 312 MG/DL (ref 170–490)
GFR SERPL CREATININE-BSD FRML MDRD: 82 ML/MIN/1.73M2
GFR SERPL CREATININE-BSD FRML MDRD: >90 ML/MIN/1.73M2
GFR SERPL CREATININE-BSD FRML MDRD: >90 ML/MIN/1.73M2
GLUCOSE BLD-MCNC: 390 MG/DL (ref 70–99)
GLUCOSE BLD-MCNC: 83 MG/DL (ref 70–125)
GLUCOSE BLD-MCNC: 96 MG/DL (ref 70–125)
GLUCOSE BLDC GLUCOMTR-MCNC: 150 MG/DL (ref 70–99)
GLUCOSE BLDC GLUCOMTR-MCNC: 164 MG/DL (ref 70–99)
GLUCOSE BLDC GLUCOMTR-MCNC: 170 MG/DL (ref 70–99)
GLUCOSE BLDC GLUCOMTR-MCNC: 192 MG/DL (ref 70–99)
GLUCOSE BLDC GLUCOMTR-MCNC: 218 MG/DL (ref 70–99)
GLUCOSE BLDC GLUCOMTR-MCNC: 243 MG/DL (ref 70–99)
GLUCOSE BLDC GLUCOMTR-MCNC: 243 MG/DL (ref 70–99)
GLUCOSE BLDC GLUCOMTR-MCNC: 299 MG/DL (ref 70–99)
GLUCOSE BLDC GLUCOMTR-MCNC: 79 MG/DL (ref 70–99)
GLUCOSE UR STRIP-MCNC: 1000 MG/DL
HCO3 BLDA-SCNC: 19 MMOL/L (ref 21–28)
HCO3 BLDA-SCNC: 27 MMOL/L (ref 21–28)
HCO3 BLDA-SCNC: 28 MMOL/L (ref 21–28)
HCO3 BLDV-SCNC: 19 MMOL/L (ref 21–28)
HCO3 BLDV-SCNC: 21 MMOL/L (ref 21–28)
HCT VFR BLD AUTO: 48.5 % (ref 35–47)
HCT VFR BLD AUTO: 53.4 % (ref 35–47)
HCT VFR BLD CALC: 43 % (ref 35–47)
HCT VFR BLD CALC: 46 % (ref 35–47)
HGB BLD-MCNC: 14.1 G/DL (ref 11.7–15.7)
HGB BLD-MCNC: 14.6 G/DL (ref 11.7–15.7)
HGB BLD-MCNC: 15.6 G/DL (ref 11.7–15.7)
HGB BLD-MCNC: 16.5 G/DL (ref 11.7–15.7)
HGB UR QL STRIP: ABNORMAL
HOLD SPECIMEN: NORMAL
HYALINE CASTS: 5 /LPF
IMM GRANULOCYTES # BLD: 0.2 10E3/UL
IMM GRANULOCYTES NFR BLD: 1 %
INR PPP: 1.3 (ref 0.85–1.15)
INR PPP: 1.47 (ref 0.85–1.15)
INTERPRETATION ECG - MUSE: NORMAL
KETONES UR STRIP-MCNC: ABNORMAL MG/DL
LACTATE BLD-SCNC: 10.5 MMOL/L
LACTATE BLD-SCNC: 9.3 MMOL/L
LACTATE SERPL-SCNC: 5.9 MMOL/L (ref 0.7–2)
LEUKOCYTE ESTERASE UR QL STRIP: NEGATIVE
LYMPHOCYTES # BLD AUTO: 2.2 10E3/UL (ref 0.8–5.3)
LYMPHOCYTES # BLD MANUAL: 7.4 10E3/UL (ref 0.8–5.3)
LYMPHOCYTES NFR BLD AUTO: 10 %
LYMPHOCYTES NFR BLD MANUAL: 60 %
MAGNESIUM SERPL-MCNC: 2.5 MG/DL (ref 1.8–2.6)
MAGNESIUM SERPL-MCNC: 2.9 MG/DL (ref 1.6–2.3)
MAGNESIUM SERPL-MCNC: 3.3 MG/DL (ref 1.6–2.3)
MCH RBC QN AUTO: 28.7 PG (ref 26.5–33)
MCH RBC QN AUTO: 28.8 PG (ref 26.5–33)
MCHC RBC AUTO-ENTMCNC: 29.1 G/DL (ref 31.5–36.5)
MCHC RBC AUTO-ENTMCNC: 30.9 G/DL (ref 31.5–36.5)
MCV RBC AUTO: 93 FL (ref 78–100)
MCV RBC AUTO: 99 FL (ref 78–100)
METAMYELOCYTES # BLD MANUAL: 0.4 10E3/UL
METAMYELOCYTES NFR BLD MANUAL: 3 %
MONOCYTES # BLD AUTO: 0.6 10E3/UL (ref 0–1.3)
MONOCYTES # BLD MANUAL: 1.1 10E3/UL (ref 0–1.3)
MONOCYTES NFR BLD AUTO: 3 %
MONOCYTES NFR BLD MANUAL: 9 %
MYELOCYTES # BLD MANUAL: 0.6 10E3/UL
MYELOCYTES NFR BLD MANUAL: 5 %
NEUTROPHILS # BLD AUTO: 19.2 10E3/UL (ref 1.6–8.3)
NEUTROPHILS # BLD MANUAL: 2.7 10E3/UL (ref 1.6–8.3)
NEUTROPHILS NFR BLD AUTO: 86 %
NEUTROPHILS NFR BLD MANUAL: 22 %
NITRATE UR QL: NEGATIVE
NRBC # BLD AUTO: 0 10E3/UL
NRBC # BLD AUTO: 0.1 10E3/UL
NRBC BLD AUTO-RTO: 0 /100
NRBC BLD MANUAL-RTO: 1 %
NT-PROBNP SERPL-MCNC: 575 PG/ML (ref 0–900)
O2/TOTAL GAS SETTING VFR VENT: 40 %
OXYHGB MFR BLDV: 24 % (ref 70–75)
P AXIS - MUSE: 77 DEGREES
PCO2 BLDA: 48 MM HG (ref 35–45)
PCO2 BLDA: 62 MM HG (ref 35–45)
PCO2 BLDA: 87 MM HG (ref 35–45)
PCO2 BLDV: 109 MM HG (ref 40–50)
PCO2 BLDV: 73 MM HG (ref 40–50)
PH BLDA: 7.11 [PH] (ref 7.35–7.45)
PH BLDA: 7.21 [PH] (ref 7.35–7.45)
PH BLDA: 7.24 [PH] (ref 7.35–7.45)
PH BLDV: 6.84 [PH] (ref 7.32–7.43)
PH BLDV: 7.06 [PH] (ref 7.32–7.43)
PH UR STRIP: 7 [PH] (ref 5–7)
PLAT MORPH BLD: ABNORMAL
PLATELET # BLD AUTO: 198 10E3/UL (ref 150–450)
PLATELET # BLD AUTO: 250 10E3/UL (ref 150–450)
PO2 BLDA: 61 MM HG (ref 80–105)
PO2 BLDA: 76 MM HG (ref 80–105)
PO2 BLDA: 91 MM HG (ref 80–105)
PO2 BLDV: 20 MM HG (ref 25–47)
PO2 BLDV: 21 MM HG (ref 25–47)
POTASSIUM BLD-SCNC: 2.4 MMOL/L (ref 3.4–5.3)
POTASSIUM BLD-SCNC: 2.5 MMOL/L (ref 3.4–5.3)
POTASSIUM BLD-SCNC: 2.7 MMOL/L (ref 3.4–5.3)
POTASSIUM BLD-SCNC: 2.7 MMOL/L (ref 3.4–5.3)
POTASSIUM BLD-SCNC: 3.6 MMOL/L (ref 3.5–5)
POTASSIUM BLD-SCNC: 4.1 MMOL/L (ref 3.4–5.3)
POTASSIUM BLD-SCNC: 4.3 MMOL/L (ref 3.5–5)
POTASSIUM BLD-SCNC: 4.7 MMOL/L (ref 3.4–5.3)
PR INTERVAL - MUSE: 148 MS
PROCALCITONIN SERPL-MCNC: <0.05 NG/ML
PROT SERPL-MCNC: 6.5 G/DL (ref 6.8–8.8)
QRS DURATION - MUSE: 160 MS
QT - MUSE: 388 MS
QTC - MUSE: 510 MS
R AXIS - MUSE: -28 DEGREES
RBC # BLD AUTO: 4.9 10E6/UL (ref 3.8–5.2)
RBC # BLD AUTO: 5.75 10E6/UL (ref 3.8–5.2)
RBC MORPH BLD: ABNORMAL
RBC URINE: 32 /HPF
SAO2 % BLDV: 11 % (ref 94–100)
SARS-COV-2 RNA RESP QL NAA+PROBE: NEGATIVE
SARS-COV-2 RNA RESP QL NAA+PROBE: POSITIVE
SODIUM BLD-SCNC: 141 MMOL/L (ref 133–144)
SODIUM BLD-SCNC: 151 MMOL/L (ref 133–144)
SODIUM SERPL-SCNC: 139 MMOL/L (ref 136–145)
SODIUM SERPL-SCNC: 141 MMOL/L (ref 136–145)
SODIUM SERPL-SCNC: 142 MMOL/L (ref 133–144)
SODIUM SERPL-SCNC: 144 MMOL/L (ref 133–144)
SODIUM SERPL-SCNC: 144 MMOL/L (ref 133–144)
SODIUM SERPL-SCNC: 147 MMOL/L (ref 133–144)
SP GR UR STRIP: 1.03 (ref 1–1.03)
SQUAMOUS EPITHELIAL: 2 /HPF
SYSTOLIC BLOOD PRESSURE - MUSE: NORMAL MMHG
T AXIS - MUSE: 85 DEGREES
TROPONIN I SERPL HS-MCNC: 82 NG/L
TSH SERPL DL<=0.005 MIU/L-ACNC: 0.35 UIU/ML (ref 0.3–5)
TSH SERPL DL<=0.005 MIU/L-ACNC: 0.89 MU/L (ref 0.4–4)
UROBILINOGEN UR STRIP-MCNC: NORMAL MG/DL
VENTRICULAR RATE- MUSE: 104 BPM
WBC # BLD AUTO: 12.3 10E3/UL (ref 4–11)
WBC # BLD AUTO: 22.2 10E3/UL (ref 4–11)
WBC URINE: 25 /HPF

## 2022-01-01 PROCEDURE — 85007 BL SMEAR W/DIFF WBC COUNT: CPT | Performed by: EMERGENCY MEDICINE

## 2022-01-01 PROCEDURE — 87040 BLOOD CULTURE FOR BACTERIA: CPT | Performed by: EMERGENCY MEDICINE

## 2022-01-01 PROCEDURE — 93005 ELECTROCARDIOGRAM TRACING: CPT | Mod: 76

## 2022-01-01 PROCEDURE — C9803 HOPD COVID-19 SPEC COLLECT: HCPCS

## 2022-01-01 PROCEDURE — 250N000012 HC RX MED GY IP 250 OP 636 PS 637: Performed by: EMERGENCY MEDICINE

## 2022-01-01 PROCEDURE — 93005 ELECTROCARDIOGRAM TRACING: CPT

## 2022-01-01 PROCEDURE — 33952 ECMO/ECLS INSJ PRPH CANNULA: CPT | Performed by: INTERNAL MEDICINE

## 2022-01-01 PROCEDURE — 250N000009 HC RX 250

## 2022-01-01 PROCEDURE — U0005 INFEC AGEN DETEC AMPLI PROBE: HCPCS | Mod: ORL | Performed by: FAMILY MEDICINE

## 2022-01-01 PROCEDURE — 96368 THER/DIAG CONCURRENT INF: CPT

## 2022-01-01 PROCEDURE — 86038 ANTINUCLEAR ANTIBODIES: CPT | Performed by: PHYSICIAN ASSISTANT

## 2022-01-01 PROCEDURE — 80051 ELECTROLYTE PANEL: CPT | Performed by: EMERGENCY MEDICINE

## 2022-01-01 PROCEDURE — 84295 ASSAY OF SERUM SODIUM: CPT

## 2022-01-01 PROCEDURE — 84484 ASSAY OF TROPONIN QUANT: CPT | Performed by: EMERGENCY MEDICINE

## 2022-01-01 PROCEDURE — 82805 BLOOD GASES W/O2 SATURATION: CPT

## 2022-01-01 PROCEDURE — 84443 ASSAY THYROID STIM HORMONE: CPT | Performed by: EMERGENCY MEDICINE

## 2022-01-01 PROCEDURE — 5A1221J PERFORMANCE OF CARDIAC OUTPUT, CONTINUOUS, AUTOMATED: ICD-10-PCS

## 2022-01-01 PROCEDURE — 83735 ASSAY OF MAGNESIUM: CPT | Performed by: EMERGENCY MEDICINE

## 2022-01-01 PROCEDURE — 82803 BLOOD GASES ANY COMBINATION: CPT

## 2022-01-01 PROCEDURE — U0003 INFECTIOUS AGENT DETECTION BY NUCLEIC ACID (DNA OR RNA); SEVERE ACUTE RESPIRATORY SYNDROME CORONAVIRUS 2 (SARS-COV-2) (CORONAVIRUS DISEASE [COVID-19]), AMPLIFIED PROBE TECHNIQUE, MAKING USE OF HIGH THROUGHPUT TECHNOLOGIES AS DESCRIBED BY CMS-2020-01-R: HCPCS | Mod: ORL | Performed by: FAMILY MEDICINE

## 2022-01-01 PROCEDURE — 250N000011 HC RX IP 250 OP 636: Performed by: EMERGENCY MEDICINE

## 2022-01-01 PROCEDURE — 3E043XZ INTRODUCTION OF VASOPRESSOR INTO CENTRAL VEIN, PERCUTANEOUS APPROACH: ICD-10-PCS | Performed by: EMERGENCY MEDICINE

## 2022-01-01 PROCEDURE — 99291 CRITICAL CARE FIRST HOUR: CPT | Mod: 25

## 2022-01-01 PROCEDURE — 5A1522G EXTRACORPOREAL OXYGENATION, MEMBRANE, PERIPHERAL VENO-ARTERIAL: ICD-10-PCS | Performed by: INTERNAL MEDICINE

## 2022-01-01 PROCEDURE — 82248 BILIRUBIN DIRECT: CPT | Performed by: EMERGENCY MEDICINE

## 2022-01-01 PROCEDURE — 85384 FIBRINOGEN ACTIVITY: CPT | Performed by: EMERGENCY MEDICINE

## 2022-01-01 PROCEDURE — 96375 TX/PRO/DX INJ NEW DRUG ADDON: CPT

## 2022-01-01 PROCEDURE — 74177 CT ABD & PELVIS W/CONTRAST: CPT

## 2022-01-01 PROCEDURE — 258N000003 HC RX IP 258 OP 636: Performed by: EMERGENCY MEDICINE

## 2022-01-01 PROCEDURE — 80048 BASIC METABOLIC PNL TOTAL CA: CPT | Performed by: PHYSICIAN ASSISTANT

## 2022-01-01 PROCEDURE — 84145 PROCALCITONIN (PCT): CPT | Performed by: EMERGENCY MEDICINE

## 2022-01-01 PROCEDURE — 250N000009 HC RX 250: Performed by: EMERGENCY MEDICINE

## 2022-01-01 PROCEDURE — 92950 HEART/LUNG RESUSCITATION CPR: CPT

## 2022-01-01 PROCEDURE — 83605 ASSAY OF LACTIC ACID: CPT

## 2022-01-01 PROCEDURE — 93227 XTRNL ECG REC<48 HR R&I: CPT | Performed by: INTERNAL MEDICINE

## 2022-01-01 PROCEDURE — 36415 COLL VENOUS BLD VENIPUNCTURE: CPT | Performed by: EMERGENCY MEDICINE

## 2022-01-01 PROCEDURE — 93010 ELECTROCARDIOGRAM REPORT: CPT | Performed by: INTERNAL MEDICINE

## 2022-01-01 PROCEDURE — 87086 URINE CULTURE/COLONY COUNT: CPT | Performed by: EMERGENCY MEDICINE

## 2022-01-01 PROCEDURE — 99214 OFFICE O/P EST MOD 30 MIN: CPT | Performed by: INTERNAL MEDICINE

## 2022-01-01 PROCEDURE — 33952 ECMO/ECLS INSJ PRPH CANNULA: CPT

## 2022-01-01 PROCEDURE — 70450 CT HEAD/BRAIN W/O DYE: CPT

## 2022-01-01 PROCEDURE — 85730 THROMBOPLASTIN TIME PARTIAL: CPT | Performed by: EMERGENCY MEDICINE

## 2022-01-01 PROCEDURE — 31500 INSERT EMERGENCY AIRWAY: CPT

## 2022-01-01 PROCEDURE — 81001 URINALYSIS AUTO W/SCOPE: CPT | Performed by: EMERGENCY MEDICINE

## 2022-01-01 PROCEDURE — 85025 COMPLETE CBC W/AUTO DIFF WBC: CPT | Performed by: EMERGENCY MEDICINE

## 2022-01-01 PROCEDURE — U0003 INFECTIOUS AGENT DETECTION BY NUCLEIC ACID (DNA OR RNA); SEVERE ACUTE RESPIRATORY SYNDROME CORONAVIRUS 2 (SARS-COV-2) (CORONAVIRUS DISEASE [COVID-19]), AMPLIFIED PROBE TECHNIQUE, MAKING USE OF HIGH THROUGHPUT TECHNOLOGIES AS DESCRIBED BY CMS-2020-01-R: HCPCS | Mod: ORL | Performed by: PHYSICIAN ASSISTANT

## 2022-01-01 PROCEDURE — 999N000157 HC STATISTIC RCP TIME EA 10 MIN

## 2022-01-01 PROCEDURE — 96365 THER/PROPH/DIAG IV INF INIT: CPT | Mod: 59

## 2022-01-01 PROCEDURE — 999N000065 XR CHEST PORT 1 VIEW

## 2022-01-01 PROCEDURE — 5A1935Z RESPIRATORY VENTILATION, LESS THAN 24 CONSECUTIVE HOURS: ICD-10-PCS | Performed by: EMERGENCY MEDICINE

## 2022-01-01 PROCEDURE — 82310 ASSAY OF CALCIUM: CPT | Performed by: FAMILY MEDICINE

## 2022-01-01 PROCEDURE — 71045 X-RAY EXAM CHEST 1 VIEW: CPT

## 2022-01-01 PROCEDURE — 33947 ECMO/ECLS INITIATION ARTERY: CPT | Performed by: INTERNAL MEDICINE

## 2022-01-01 PROCEDURE — 94002 VENT MGMT INPAT INIT DAY: CPT

## 2022-01-01 PROCEDURE — 85610 PROTHROMBIN TIME: CPT | Performed by: EMERGENCY MEDICINE

## 2022-01-01 PROCEDURE — 36556 INSERT NON-TUNNEL CV CATH: CPT

## 2022-01-01 PROCEDURE — 94003 VENT MGMT INPAT SUBQ DAY: CPT

## 2022-01-01 PROCEDURE — 200N000001 HC R&B ICU

## 2022-01-01 PROCEDURE — 82310 ASSAY OF CALCIUM: CPT | Performed by: EMERGENCY MEDICINE

## 2022-01-01 PROCEDURE — 999N000026 HC STATISTIC CARDIOPULM RESUSCITATION

## 2022-01-01 PROCEDURE — 86140 C-REACTIVE PROTEIN: CPT | Performed by: EMERGENCY MEDICINE

## 2022-01-01 PROCEDURE — 272N000007 HC KIT ART LINE INSERTION

## 2022-01-01 PROCEDURE — C1894 INTRO/SHEATH, NON-LASER: HCPCS | Performed by: INTERNAL MEDICINE

## 2022-01-01 PROCEDURE — 250N000011 HC RX IP 250 OP 636

## 2022-01-01 PROCEDURE — 272N000001 HC OR GENERAL SUPPLY STERILE: Performed by: INTERNAL MEDICINE

## 2022-01-01 PROCEDURE — 84443 ASSAY THYROID STIM HORMONE: CPT | Performed by: PHYSICIAN ASSISTANT

## 2022-01-01 PROCEDURE — 85652 RBC SED RATE AUTOMATED: CPT | Performed by: PHYSICIAN ASSISTANT

## 2022-01-01 PROCEDURE — 85027 COMPLETE CBC AUTOMATED: CPT | Performed by: EMERGENCY MEDICINE

## 2022-01-01 PROCEDURE — 36620 INSERTION CATHETER ARTERY: CPT | Mod: GC

## 2022-01-01 PROCEDURE — C1769 GUIDE WIRE: HCPCS | Performed by: INTERNAL MEDICINE

## 2022-01-01 PROCEDURE — 93226 XTRNL ECG REC<48 HR SCAN A/R: CPT

## 2022-01-01 PROCEDURE — 6A4Z0ZZ HYPOTHERMIA, SINGLE: ICD-10-PCS | Performed by: EMERGENCY MEDICINE

## 2022-01-01 PROCEDURE — 250N000011 HC RX IP 250 OP 636: Performed by: INTERNAL MEDICINE

## 2022-01-01 PROCEDURE — 96366 THER/PROPH/DIAG IV INF ADDON: CPT

## 2022-01-01 PROCEDURE — 999N000185 HC STATISTIC TRANSPORT TIME EA 15 MIN

## 2022-01-01 PROCEDURE — 80051 ELECTROLYTE PANEL: CPT | Performed by: STUDENT IN AN ORGANIZED HEALTH CARE EDUCATION/TRAINING PROGRAM

## 2022-01-01 PROCEDURE — U0005 INFEC AGEN DETEC AMPLI PROBE: HCPCS | Performed by: EMERGENCY MEDICINE

## 2022-01-01 PROCEDURE — 83735 ASSAY OF MAGNESIUM: CPT | Performed by: PHYSICIAN ASSISTANT

## 2022-01-01 PROCEDURE — 36620 INSERTION CATHETER ARTERY: CPT

## 2022-01-01 PROCEDURE — 999N000009 HC STATISTIC AIRWAY CARE

## 2022-01-01 PROCEDURE — 83880 ASSAY OF NATRIURETIC PEPTIDE: CPT | Performed by: EMERGENCY MEDICINE

## 2022-01-01 PROCEDURE — 85652 RBC SED RATE AUTOMATED: CPT | Performed by: EMERGENCY MEDICINE

## 2022-01-01 PROCEDURE — 99292 CRITICAL CARE ADDL 30 MIN: CPT

## 2022-01-01 RX ORDER — IOPAMIDOL 755 MG/ML
132 INJECTION, SOLUTION INTRAVASCULAR ONCE
Status: COMPLETED | OUTPATIENT
Start: 2022-01-01 | End: 2022-01-01

## 2022-01-01 RX ORDER — EPINEPHRINE IN 0.9 % SOD CHLOR 5 MG/250ML
.01-.3 PLASTIC BAG, INJECTION (ML) INTRAVENOUS CONTINUOUS
Status: DISCONTINUED | OUTPATIENT
Start: 2022-01-01 | End: 2022-01-01 | Stop reason: HOSPADM

## 2022-01-01 RX ORDER — LEVOTHYROXINE SODIUM 112 UG/1
112 TABLET ORAL
Status: DISCONTINUED | OUTPATIENT
Start: 2022-01-01 | End: 2022-01-01 | Stop reason: HOSPADM

## 2022-01-01 RX ORDER — PIPERACILLIN SODIUM, TAZOBACTAM SODIUM 4; .5 G/20ML; G/20ML
4.5 INJECTION, POWDER, LYOPHILIZED, FOR SOLUTION INTRAVENOUS ONCE
Status: COMPLETED | OUTPATIENT
Start: 2022-01-01 | End: 2022-01-01

## 2022-01-01 RX ORDER — FENTANYL CITRATE 50 UG/ML
50 INJECTION, SOLUTION INTRAMUSCULAR; INTRAVENOUS EVERY 30 MIN PRN
Status: DISCONTINUED | OUTPATIENT
Start: 2022-01-01 | End: 2022-01-01 | Stop reason: HOSPADM

## 2022-01-01 RX ORDER — PROPOFOL 10 MG/ML
5-75 INJECTION, EMULSION INTRAVENOUS CONTINUOUS
Status: DISCONTINUED | OUTPATIENT
Start: 2022-01-01 | End: 2022-01-01 | Stop reason: HOSPADM

## 2022-01-01 RX ORDER — METOPROLOL TARTRATE 1 MG/ML
5 INJECTION, SOLUTION INTRAVENOUS EVERY 4 HOURS PRN
Status: DISCONTINUED | OUTPATIENT
Start: 2022-01-01 | End: 2022-01-01 | Stop reason: HOSPADM

## 2022-01-01 RX ORDER — PROPOFOL 10 MG/ML
5-75 INJECTION, EMULSION INTRAVENOUS CONTINUOUS
Status: DISCONTINUED | OUTPATIENT
Start: 2022-01-01 | End: 2022-01-01

## 2022-01-01 RX ORDER — CALCIUM GLUCONATE 20 MG/ML
1 INJECTION, SOLUTION INTRAVENOUS ONCE
Status: DISCONTINUED | OUTPATIENT
Start: 2022-01-01 | End: 2022-01-01

## 2022-01-01 RX ORDER — HEPARIN SODIUM 5000 [USP'U]/.5ML
5000 INJECTION, SOLUTION INTRAVENOUS; SUBCUTANEOUS EVERY 8 HOURS
Status: DISCONTINUED | OUTPATIENT
Start: 2022-01-01 | End: 2022-01-01

## 2022-01-01 RX ORDER — NOREPINEPHRINE BITARTRATE 0.06 MG/ML
.01-.6 INJECTION, SOLUTION INTRAVENOUS CONTINUOUS
Status: DISCONTINUED | OUTPATIENT
Start: 2022-01-01 | End: 2022-01-01 | Stop reason: HOSPADM

## 2022-01-01 RX ORDER — VECURONIUM BROMIDE 1 MG/ML
0.1 INJECTION, POWDER, LYOPHILIZED, FOR SOLUTION INTRAVENOUS
Status: DISCONTINUED | OUTPATIENT
Start: 2022-01-01 | End: 2022-01-01 | Stop reason: HOSPADM

## 2022-01-01 RX ORDER — MEPERIDINE HYDROCHLORIDE 25 MG/ML
25-50 INJECTION INTRAMUSCULAR; INTRAVENOUS; SUBCUTANEOUS
Status: DISCONTINUED | OUTPATIENT
Start: 2022-01-01 | End: 2022-01-01 | Stop reason: HOSPADM

## 2022-01-01 RX ORDER — NICOTINE POLACRILEX 4 MG
15-30 LOZENGE BUCCAL
Status: DISCONTINUED | OUTPATIENT
Start: 2022-01-01 | End: 2022-01-01 | Stop reason: HOSPADM

## 2022-01-01 RX ORDER — BISACODYL 5 MG
15 TABLET, DELAYED RELEASE (ENTERIC COATED) ORAL DAILY PRN
Status: DISCONTINUED | OUTPATIENT
Start: 2022-01-01 | End: 2022-01-01 | Stop reason: HOSPADM

## 2022-01-01 RX ORDER — POTASSIUM CHLORIDE 29.8 MG/ML
20 INJECTION INTRAVENOUS ONCE
Status: COMPLETED | OUTPATIENT
Start: 2022-01-01 | End: 2022-01-01

## 2022-01-01 RX ORDER — PROCHLORPERAZINE MALEATE 10 MG
10 TABLET ORAL EVERY 6 HOURS PRN
Status: DISCONTINUED | OUTPATIENT
Start: 2022-01-01 | End: 2022-01-01 | Stop reason: HOSPADM

## 2022-01-01 RX ORDER — CHLORHEXIDINE GLUCONATE ORAL RINSE 1.2 MG/ML
15 SOLUTION DENTAL EVERY 12 HOURS
Status: DISCONTINUED | OUTPATIENT
Start: 2022-01-01 | End: 2022-01-01 | Stop reason: HOSPADM

## 2022-01-01 RX ORDER — CALCIUM CHLORIDE 100 MG/ML
INJECTION INTRAVENOUS; INTRAVENTRICULAR
Status: DISCONTINUED
Start: 2022-01-01 | End: 2022-01-01 | Stop reason: HOSPADM

## 2022-01-01 RX ORDER — NICOTINE POLACRILEX 4 MG
15-30 LOZENGE BUCCAL
Status: DISCONTINUED | OUTPATIENT
Start: 2022-01-01 | End: 2022-01-01

## 2022-01-01 RX ORDER — CALCIUM CHLORIDE 100 MG/ML
1 INJECTION INTRAVENOUS; INTRAVENTRICULAR ONCE
Status: DISCONTINUED | OUTPATIENT
Start: 2022-01-01 | End: 2022-01-01

## 2022-01-01 RX ORDER — ONDANSETRON 2 MG/ML
4 INJECTION INTRAMUSCULAR; INTRAVENOUS EVERY 6 HOURS PRN
Status: DISCONTINUED | OUTPATIENT
Start: 2022-01-01 | End: 2022-01-01 | Stop reason: HOSPADM

## 2022-01-01 RX ORDER — FENTANYL CITRATE 50 UG/ML
50 INJECTION, SOLUTION INTRAMUSCULAR; INTRAVENOUS
Status: DISCONTINUED | OUTPATIENT
Start: 2022-01-01 | End: 2022-01-01

## 2022-01-01 RX ORDER — VECURONIUM BROMIDE 1 MG/ML
10 INJECTION, POWDER, LYOPHILIZED, FOR SOLUTION INTRAVENOUS ONCE
Status: COMPLETED | OUTPATIENT
Start: 2022-01-01 | End: 2022-01-01

## 2022-01-01 RX ORDER — DEXTROSE MONOHYDRATE 25 G/50ML
25-50 INJECTION, SOLUTION INTRAVENOUS
Status: DISCONTINUED | OUTPATIENT
Start: 2022-01-01 | End: 2022-01-01

## 2022-01-01 RX ORDER — DEXTROSE MONOHYDRATE 25 G/50ML
25-50 INJECTION, SOLUTION INTRAVENOUS
Status: DISCONTINUED | OUTPATIENT
Start: 2022-01-01 | End: 2022-01-01 | Stop reason: HOSPADM

## 2022-01-01 RX ORDER — CALCIUM CHLORIDE 100 MG/ML
1 INJECTION INTRAVENOUS; INTRAVENTRICULAR ONCE
Status: DISCONTINUED | OUTPATIENT
Start: 2022-01-01 | End: 2022-01-01 | Stop reason: HOSPADM

## 2022-01-01 RX ORDER — NALOXONE HYDROCHLORIDE 0.4 MG/ML
0.4 INJECTION, SOLUTION INTRAMUSCULAR; INTRAVENOUS; SUBCUTANEOUS
Status: DISCONTINUED | OUTPATIENT
Start: 2022-01-01 | End: 2022-01-01 | Stop reason: HOSPADM

## 2022-01-01 RX ORDER — FENTANYL CITRATE 50 UG/ML
50 INJECTION, SOLUTION INTRAMUSCULAR; INTRAVENOUS EVERY 30 MIN PRN
Status: DISCONTINUED | OUTPATIENT
Start: 2022-01-01 | End: 2022-01-01

## 2022-01-01 RX ORDER — BISACODYL 5 MG
5 TABLET, DELAYED RELEASE (ENTERIC COATED) ORAL DAILY PRN
Status: DISCONTINUED | OUTPATIENT
Start: 2022-01-01 | End: 2022-01-01 | Stop reason: HOSPADM

## 2022-01-01 RX ORDER — NOREPINEPHRINE BITARTRATE 0.02 MG/ML
.01-.6 INJECTION, SOLUTION INTRAVENOUS CONTINUOUS
Status: DISCONTINUED | OUTPATIENT
Start: 2022-01-01 | End: 2022-01-01 | Stop reason: HOSPADM

## 2022-01-01 RX ORDER — VECURONIUM BROMIDE 1 MG/ML
5 INJECTION, POWDER, LYOPHILIZED, FOR SOLUTION INTRAVENOUS EVERY 30 MIN PRN
Status: DISCONTINUED | OUTPATIENT
Start: 2022-01-01 | End: 2022-01-01

## 2022-01-01 RX ORDER — NALOXONE HYDROCHLORIDE 0.4 MG/ML
0.2 INJECTION, SOLUTION INTRAMUSCULAR; INTRAVENOUS; SUBCUTANEOUS
Status: DISCONTINUED | OUTPATIENT
Start: 2022-01-01 | End: 2022-01-01 | Stop reason: HOSPADM

## 2022-01-01 RX ORDER — BISACODYL 5 MG
10 TABLET, DELAYED RELEASE (ENTERIC COATED) ORAL DAILY PRN
Status: DISCONTINUED | OUTPATIENT
Start: 2022-01-01 | End: 2022-01-01 | Stop reason: HOSPADM

## 2022-01-01 RX ORDER — MIDAZOLAM HCL IN 0.9 % NACL/PF 1 MG/ML
1-8 PLASTIC BAG, INJECTION (ML) INTRAVENOUS CONTINUOUS
Status: DISCONTINUED | OUTPATIENT
Start: 2022-01-01 | End: 2022-01-01 | Stop reason: HOSPADM

## 2022-01-01 RX ORDER — PIPERACILLIN SODIUM, TAZOBACTAM SODIUM 3; .375 G/15ML; G/15ML
3.38 INJECTION, POWDER, LYOPHILIZED, FOR SOLUTION INTRAVENOUS EVERY 6 HOURS
Status: DISCONTINUED | OUTPATIENT
Start: 2022-01-01 | End: 2022-01-01 | Stop reason: HOSPADM

## 2022-01-01 RX ORDER — HEPARIN SODIUM 1000 [USP'U]/ML
INJECTION, SOLUTION INTRAVENOUS; SUBCUTANEOUS
Status: DISCONTINUED | OUTPATIENT
Start: 2022-01-01 | End: 2022-01-01 | Stop reason: HOSPADM

## 2022-01-01 RX ORDER — PROCHLORPERAZINE 25 MG
25 SUPPOSITORY, RECTAL RECTAL EVERY 12 HOURS PRN
Status: DISCONTINUED | OUTPATIENT
Start: 2022-01-01 | End: 2022-01-01 | Stop reason: HOSPADM

## 2022-01-01 RX ORDER — VECURONIUM BROMIDE 1 MG/ML
10 INJECTION, POWDER, LYOPHILIZED, FOR SOLUTION INTRAVENOUS
Status: DISCONTINUED | OUTPATIENT
Start: 2022-01-01 | End: 2022-01-01

## 2022-01-01 RX ORDER — HEPARIN SODIUM 5000 [USP'U]/.5ML
5000 INJECTION, SOLUTION INTRAVENOUS; SUBCUTANEOUS EVERY 8 HOURS
Status: DISCONTINUED | OUTPATIENT
Start: 2022-01-01 | End: 2022-01-01 | Stop reason: HOSPADM

## 2022-01-01 RX ORDER — ONDANSETRON 4 MG/1
4 TABLET, ORALLY DISINTEGRATING ORAL EVERY 6 HOURS PRN
Status: DISCONTINUED | OUTPATIENT
Start: 2022-01-01 | End: 2022-01-01 | Stop reason: HOSPADM

## 2022-01-01 RX ORDER — VECURONIUM BROMIDE 1 MG/ML
0.05 INJECTION, POWDER, LYOPHILIZED, FOR SOLUTION INTRAVENOUS EVERY 30 MIN PRN
Status: DISCONTINUED | OUTPATIENT
Start: 2022-01-01 | End: 2022-01-01 | Stop reason: HOSPADM

## 2022-01-01 RX ORDER — ALBUTEROL SULFATE 90 UG/1
6 AEROSOL, METERED RESPIRATORY (INHALATION) EVERY 4 HOURS
Status: DISCONTINUED | OUTPATIENT
Start: 2022-01-01 | End: 2022-01-01 | Stop reason: HOSPADM

## 2022-01-01 RX ADMIN — IOPAMIDOL 132 ML: 755 INJECTION, SOLUTION INTRAVENOUS at 21:20

## 2022-01-01 RX ADMIN — VECURONIUM BROMIDE 10 MG: 1 INJECTION, POWDER, LYOPHILIZED, FOR SOLUTION INTRAVENOUS at 22:23

## 2022-01-01 RX ADMIN — INSULIN ASPART 3 UNITS: 100 INJECTION, SOLUTION INTRAVENOUS; SUBCUTANEOUS at 02:20

## 2022-01-01 RX ADMIN — MINERAL OIL, PETROLATUM: 425; 573 OINTMENT OPHTHALMIC at 01:33

## 2022-01-01 RX ADMIN — SODIUM CHLORIDE 1000 ML: 9 INJECTION, SOLUTION INTRAVENOUS at 23:33

## 2022-01-01 RX ADMIN — Medication 0.1 MCG/KG/MIN: at 05:02

## 2022-01-01 RX ADMIN — SODIUM BICARBONATE 50 MEQ: 84 INJECTION INTRAVENOUS at 09:00

## 2022-01-01 RX ADMIN — INSULIN ASPART 2 UNITS: 100 INJECTION, SOLUTION INTRAVENOUS; SUBCUTANEOUS at 04:45

## 2022-01-01 RX ADMIN — SODIUM CHLORIDE 100 ML: 900 INJECTION INTRAVENOUS at 21:21

## 2022-01-01 RX ADMIN — MIDAZOLAM HYDROCHLORIDE 2 MG/HR: 1 INJECTION, SOLUTION INTRAVENOUS at 20:46

## 2022-01-01 RX ADMIN — PIPERACILLIN AND TAZOBACTAM 4.5 G: 4; .5 INJECTION, POWDER, FOR SOLUTION INTRAVENOUS at 22:05

## 2022-01-01 RX ADMIN — POTASSIUM CHLORIDE 20 MEQ: 29.8 INJECTION, SOLUTION INTRAVENOUS at 03:45

## 2022-01-01 RX ADMIN — PIPERACILLIN AND TAZOBACTAM 3.38 G: 3; .375 INJECTION, POWDER, FOR SOLUTION INTRAVENOUS at 03:41

## 2022-01-01 RX ADMIN — Medication 0.03 MCG/KG/MIN: at 22:51

## 2022-01-01 RX ADMIN — EPINEPHRINE 0.1 MCG/KG/MIN: 1 INJECTION INTRAMUSCULAR; INTRAVENOUS; SUBCUTANEOUS at 20:20

## 2022-01-01 ASSESSMENT — ACTIVITIES OF DAILY LIVING (ADL)
ADLS_ACUITY_SCORE: 47
ADLS_ACUITY_SCORE: 35
ADLS_ACUITY_SCORE: 47

## 2022-05-12 NOTE — PROGRESS NOTES
HEART CARE ENCOUNTER CONSULTATON NOTE      [facility]  [phone]      Assessment/Recommendations   Assessment/Plan:  1.  History of Tetralogy of Fallot with prior surgical intervention as described.  Cardiac MRI August 2019 with left ventricular function reported EF of 46% with mild reduction in right ventricular function, moderate mitral and tricuspid insufficiency with stable function pulmonic valve.  Mild stenosis in the distal main pulmonary artery.  Most recent echocardiogram is reviewed from December 2021 which demonstrates normal left ventricular function and low normal right ventricular function and bioprosthetic valve in the pulmonic position with a mean gradient that was lower at 13 mmHg.  We are going to plan follow-up Holter monitor to further assess rhythm given her history of tetralogy of Fallot repair.    2.  Weight reduction.  We discussed potential benefits and mechanisms for weight loss.  She was going to try some things on her own and then update me with questions.    3.  Hypertension.  Blood pressure has been under very good control and has been able to come off medication since the adrenal surgery.    Plan Holter monitor and follow-up in 6 months       History of Present Illness/Subjective    HPI: Alma Rosa Mak is a 61 year old female who is seen in follow-up.  As outlined she has a history of tetralogy of Fallot with initial operation at age 7 and subsequent procedure at the [facility] 2010 including tricuspid valve repair, closure of VSD, right-sided Maze procedure and porcine pulmonic valve placed.  She has a history of premature ventricular complexes with the most recent Holter October 2020.  Overall she has been doing well.  She is somewhat limited in her exercise pursuits because of some going numbness of her feet and she is being considered for a nerve stimulator per her report to me today.  We did discuss the potential benefits of weight loss today.  She does  appear committed and will try to do it on her own initially and then be in touch with me if she would like additional recommendations.  She does not describe chest pain, orthopnea or significant palpitations.  She has been able to come off a number of medications since the adrenal surgery with much more stable potassium.    Recent Echocardiogram Results:  Name: MAKAYLA HENRY  MRN: [id number]  : 1960  Study Date: 12/10/2021 07:55 AM  Age: 61 yrs  Gender: Female  Patient Location: [facility]  Reason For Study: Tetralogy of Fallot  Ordering Physician: YUNG JAMA  Referring Physician: YUNG JAMA  Performed By: LETICIA     BSA: 2.2 m2  Height: 64 in  Weight: 260 lb  HR: 75  BP: 135/85 mmHg  ______________________________________________________________________________  ______________________________________________________________________________  Interpretation Summary     1. Normal left ventricular size and systolic performance with a visually  estimated ejection fraction of 55%.  2. There is mild aortic insufficiency.  3. There is a documented bioprosthetic pulmonic valve.  Â  The mean gradient is measured at 13 mmHg with peak velocity of 2.4 m/s.  Â  There is trace pulmonic insufficiency which appears valvar.  4. Mild right ventricular enlargement with low normal right ventricular  systolic performance.  5. There is moderate left atrial enlargement. There is mild right atrial  enlargement.  6. There is mild aortic root enlargement.     The prior real-time echocardiogram could not be accessed from the digital  archive for direct comparison.  ______________________________________________________________________________  Left ventricle:  Normal left ventricular size and systolic performance with a visually  estimated ejection fraction of 55%. There is normal regional wall motion. Left  ventricular wall thickness is normal..     Assessment of LV Diastolic Function: The cumulative findings are  indeterminate  in the evaluation of diastolic function [The septal e' velocity is < 7 cm/s &  lateral e' velocity is > 10 cm/s. The average E/e' is < 14. The TR velocity  cannot be determined due to insufficient tricuspid insufficiency signal. Left  atrial volume index is greater than 34 mL/mÂ ].     Right ventricle:  Mild right ventricular enlargement with low normal right ventricular systolic  performance.     Left atrium:  There is moderate left atrial enlargement.     Right atrium:  There is mild right atrial enlargement.     IVC:  The IVC is of normal caliber.     Aortic valve:  The aortic valve is comprised of three cusps. There is no significant aortic  stenosis. There is mild aortic insufficiency.     Mitral valve:  The mitral valve appears morphologically normal. There is trace mitral  insufficiency.     Tricuspid valve:  The tricuspid valve is grossly morphologically normal. There is trace  tricuspid insufficiency.     Pulmonic valve:  There is a documented bioprosthetic pulmonic valve. The mean gradient is  measured at 13 mmHg with peak velocity of 2.4 m/s. There is trace pulmonic  insufficiency which appears valvar.     Thoracic aorta:  There is mild aortic root enlargement.     Pericardium:  There is no significant pericardial effusion.  ______________________________________________________________________________     INTERPRETATION DATE:  10/16/2020     TEST DATE:  10/15/2020     INTERPRETATION:  Predominant rhythm is sinus rhythm with rates ranging from 66 beats  per minute to 152 beats per minute.  There was no significant bradycardia or pauses.   A nonspecific IVCD is present throughout the monitoring period.  Occasional atrial  premature beats were noted, 899.  There were several 3 to 5 beat runs of primary  atrial tachycardia, average rate of approximately 150 beats per minute.  A moderate  number of ventricular premature beats were noted 1522 or 1% of all heartbeats.   There was some ventricular  trigeminy present.  There were 32 couplets and 4  triplets.  The predominant PVC morphology had a right bundle configuration and  inferior axis consistent with benign ventricular ectopy from the outflow tract.  No  symptoms were described in the diary.     CONCLUSION:  Occasional atrial premature beats and short runs of atrial tachycardia  noted.  A moderate number of outflow tract PVCs were also noted.        LORENZO HOROWITZ MD  tn  D 10/16/2020 14:37:04  T 10/16/2020 15:24:15  R 10/16/2020 15:24:15  [id number]      Recent Coronary Angiogram Results:         Physical Examination  Review of Systems   Vitals: 115/70, weight 262 pounds, heart rate 73 and regular  Wt Readings from Last 3 Encounters:   11/29/21 117.9 kg (260 lb)   04/23/21 112.6 kg (248 lb 3.2 oz)   09/18/20 106.3 kg (234 lb 6.4 oz)       General Appearance:   no distress, normal body habitus   ENT/Mouth:  Facemask in place   EYES:  no scleral icterus, normal conjunctivae   Neck: no carotid bruits or thyromegaly   Chest/Lungs:   lungs are clear to auscultation, no rales or wheezing, well-healed sternal scar, equal chest wall expansion    Cardiovascular:   Regular. Normal first and second heart sounds with 3/6 systolic ; the carotid, radial and posterior tibial pulses are intact, Jugular venous pressure within normal limits, mild, improved edema bilaterally    Abdomen:  no organomegaly, masses, bruits, or tenderness; bowel sounds are present   Extremities: no cyanosis or clubbing   Skin: no xanthelasma, warm.    Neurologic: , no tremors     Psychiatric: alert and oriented x3, calm        Please refer above for cardiac ROS details.        Medical History  Surgical History Family History Social History   Past Medical History:   Diagnosis Date     Anemia     daily supplement.  no transfusion hx     Arthritis     OA     CHF (congestive heart failure) (H)     diastolic     Chronic diastolic heart failure (H)      Depression      Disease of thyroid gland      GERD  (gastroesophageal reflux disease)      Hay fever      [place] (hard of hearing)      Hypertension      Menorrhagia with irregular cycle      Migraine      PAC (premature atrial contraction)      Polyarthralgia      PONV (postoperative nausea and vomiting)      Sleep apnea     CPAP     Tetralogy of Fallot      Past Surgical History:   Procedure Laterality Date     ARTHROPLASTY REVISION KNEE Left 10/29/2019    Procedure: REVISION TOTAL KNEE ARTHROPLASTY, LEFT TIBIAL REVISON;  Surgeon: Cezar Lockwood DO;  Location: [facility] Main OR;  Service: Orthopedics     ARTHROSCOPY KNEE       BUNIONECTOMY Bilateral      CHOLECYSTECTOMY       FOOT SURGERY       GASTRIC BYPASS       HYSTERECTOMY       JOINT REPLACEMENT Left     knee and revision     REPLACE VALVE MITRAL  2010     [facility] REPLACEMENT, PULMONARY VALVE  2010    Description: Pulmonary Valve Replacement;  Recorded: 02/20/2012;     [facility] TOTAL KNEE ARTHROPLASTY Left 10/18/2016    Procedure: LEFT KNEE TOTAL ARTHROPLASTY;  Surgeon: Chucky Grove MD;  Location: [facility] Main OR;  Service: Orthopedics     [facility] TOTAL KNEE ARTHROPLASTY Right 2/27/2020    Procedure: RIGHT TOTAL KNEE ARTHROPLASTY;  Surgeon: Cezar Lockwood DO;  Location: [facility] Main OR;  Service: Orthopedics     Family History   Problem Relation Age of Onset     Heart Disease Father      Mental Illness Sister         Social History     Socioeconomic History     Marital status:      Spouse name: Not on file     Number of children: Not on file     Years of education: Not on file     Highest education level: Not on file   Occupational History     Not on file   Tobacco Use     Smoking status: Never Smoker     Smokeless tobacco: Never Used   Substance and Sexual Activity     Alcohol use: Yes     Comment: Alcoholic Drinks/day: rare     Drug use: Yes     Types: Marijuana     Comment: Drug use: Medical marijuana started 9/19     Sexual activity: Not on file   Other Topics Concern     Not on file   Social History  Narrative     Not on file     Social Determinants of Health     Financial Resource Strain: Not on file   Food Insecurity: Not on file   Transportation Needs: Not on file   Physical Activity: Not on file   Stress: Not on file   Social Connections: Not on file   Intimate Partner Violence: Not on file   Housing Stability: Not on file           Medications  Allergies   Current Outpatient Medications   Medication Sig Dispense Refill     acetaminophen (TYLENOL) 500 MG tablet [ACETAMINOPHEN (TYLENOL) 500 MG TABLET] Take 1,000 mg by mouth every 6 (six) hours as needed for pain.       albuterol (PROVENTIL HFA;VENTOLIN HFA) 90 mcg/actuation inhaler [ALBUTEROL (PROVENTIL HFA;VENTOLIN HFA) 90 MCG/ACTUATION INHALER] Inhale 2 puffs every 4 (four) hours as needed for wheezing or shortness of breath.        amoxicillin (AMOXIL) 500 MG capsule [AMOXICILLIN (AMOXIL) 500 MG CAPSULE] Take 2,000 mg by mouth as needed (prior to dental procedures).       CALCIUM CARBONATE/VITAMIN D3 (CALCIUM 600 + D,3, ORAL) [CALCIUM CARBONATE/VITAMIN D3 (CALCIUM 600 + D,3, ORAL)] Take 1 tablet by mouth 2 (two) times a day.       cholecalciferol, vitamin D3, 1,000 unit tablet [CHOLECALCIFEROL, VITAMIN D3, 1,000 UNIT TABLET] Take 1,000 Units by mouth every evening.        cyanocobalamin 500 MCG tablet [CYANOCOBALAMIN 500 MCG TABLET] Take 500 mcg by mouth daily.        cyclobenzaprine (FLEXERIL) 10 MG tablet [CYCLOBENZAPRINE (FLEXERIL) 10 MG TABLET] Take 10 mg by mouth 3 (three) times a day as needed for muscle spasms.       DULoxetine (CYMBALTA) 30 MG capsule [DULOXETINE (CYMBALTA) 30 MG CAPSULE] Take 30 mg by mouth at bedtime.       DULoxetine (CYMBALTA) 30 MG capsule [DULOXETINE (CYMBALTA) 30 MG CAPSULE] Take 60 mg by mouth every morning.        HYDROcodone-acetaminophen 5-325 mg per tablet [HYDROCODONE-ACETAMINOPHEN 5-325 MG PER TABLET] TAKE 1 2 TABS EVERY 6 8 HOURS AS NEEDED FOR CHRONIC PAIN. MAX 3/DAY.       levothyroxine (TIROSINT) 112 MCG capsule  Take 1 capsule by mouth daily       losartan (COZAAR) 50 MG tablet [LOSARTAN (COZAAR) 50 MG TABLET] Take 50 mg by mouth 2 (two) times a day.        medical cannabis (Patient's own supply) [MEDICAL CANNABIS (PATIENT'S OWN SUPPLY)] 1 Dose by Other route see administration instructions. (The purpose of this order is to document that the patient reports taking medical cannabis. This is not a prescription, and is not used to certify that the patient has a  qualifying medical condition.)       multivitamin therapeutic (THERAGRAN) tablet [MULTIVITAMIN THERAPEUTIC (THERAGRAN) TABLET] Take 1 tablet by mouth 2 (two) times a day.       omeprazole (PRILOSEC) 20 MG capsule [OMEPRAZOLE (PRILOSEC) 20 MG CAPSULE] Take 20 mg by mouth daily.       potassium chloride SA (K-DUR,KLOR-CON) 20 MEQ tablet [POTASSIUM CHLORIDE SA (K-DUR,KLOR-CON) 20 MEQ TABLET] Take 20 mEq by mouth 4 (four) times a day.       spironolactone (ALDACTONE) 25 MG tablet Take 25 mg by mouth 2 times daily        triamterene-HCTZ (MAXZIDE-25) 37.5-25 MG tablet Take 1 tablet by mouth daily         Allergies   Allergen Reactions     Ibuprofen Itching and Swelling     Throat constriction and urticaria     Nsaids (Non-Steroidal Anti-Inflammatory Drug) [Nsaids] Anaphylaxis     Sulfa (Sulfonamide Antibiotics) [Sulfa Drugs] Rash          Lab Results    Chemistry/lipid CBC Cardiac Enzymes/BNP/TSH/INR   Recent Labs   Lab Test 06/17/19  1539   CHOL 157   HDL 57   LDL 81   TRIG 95     Recent Labs   Lab Test 06/17/19  1539 12/07/18  0741 08/24/17  0804   LDL 81 76 77     Recent Labs   Lab Test 03/30/22  1546      POTASSIUM 3.6   CHLORIDE 103   CO2 27   GLC 96   BUN 12   CR 0.66   GFRESTIMATED >90   ZACK 9.1     Recent Labs   Lab Test 03/30/22  1546 01/27/22  1158 12/29/21  0849   CR 0.66 0.68 0.73     No results for input(s): A1C in the last 40589 hours.       Recent Labs   Lab Test 02/28/20  0511 02/27/20  0630   WBC  --  9.0   HGB 12.6 14.7   HCT  --  45.0   MCV  --  89    PLT  --  228     Recent Labs   Lab Test 02/28/20  0511 02/27/20  0630 10/31/19  0605   HGB 12.6 14.7 11.8*    No results for input(s): TROPONINI in the last 18659 hours.  Recent Labs   Lab Test 12/22/21  1014 09/11/18  1212   BNP 89 52     Recent Labs   Lab Test 03/30/22  1546   TSH 0.35     Recent Labs   Lab Test 10/29/19  1122   INR 0.93        Johnny Yancey MD

## 2022-05-12 NOTE — LETTER
5/12/2022    Mechelle Lugo PA-C  [facility] [address] [facility] [place] [address]    RE: Alma Rosa Mak       Dear Colleague,     [facility] the [facility].    HEART CARE ENCOUNTER CONSULTATON NOTE      [facility]  [phone]      Assessment/Recommendations   Assessment/Plan:  1.  History of Tetralogy of Fallot with prior surgical intervention as described.  Cardiac MRI August 2019 with left ventricular function reported EF of 46% with mild reduction in right ventricular function, moderate mitral and tricuspid insufficiency with stable function pulmonic valve.  Mild stenosis in the distal main pulmonary artery.  Most recent echocardiogram is reviewed from December 2021 which demonstrates normal left ventricular function and low normal right ventricular function and bioprosthetic valve in the pulmonic position with a mean gradient that was lower at 13 mmHg.  We are going to plan follow-up Holter monitor to further assess rhythm given her history of tetralogy of Fallot repair.    2.  Weight reduction.  We discussed potential benefits and mechanisms for weight loss.  She was going to try some things on her own and then update me with questions.    3.  Hypertension.  Blood pressure has been under very good control and has been able to come off medication since the adrenal surgery.    Plan Holter monitor and follow-up in 6 months       History of Present Illness/Subjective    HPI: Alma Rosa Mak is a 61 year old female who is seen in follow-up.  As outlined she has a history of tetralogy of Fallot with initial operation at age 7 and subsequent procedure at the [facility] 2010 including tricuspid valve repair, closure of VSD, right-sided Maze procedure and porcine pulmonic valve placed.  She has a history of premature ventricular complexes with the most recent Holter October 2020.  Overall she has been doing well.  She is somewhat limited  in her exercise pursuits because of some going numbness of her feet and she is being considered for a nerve stimulator per her report to me today.  We did discuss the potential benefits of weight loss today.  She does appear committed and will try to do it on her own initially and then be in touch with me if she would like additional recommendations.  She does not describe chest pain, orthopnea or significant palpitations.  She has been able to come off a number of medications since the adrenal surgery with much more stable potassium.    Recent Echocardiogram Results:  Name: MAKAYLA HENRY  MRN: [id number]  : 1960  Study Date: 12/10/2021 07:55 AM  Age: 61 yrs  Gender: Female  Patient Location: [facility]  Reason For Study: Tetralogy of Fallot  Ordering Physician: YUNG JAMA  Referring Physician: YUNG JAMA  Performed By: LETICIA     BSA: 2.2 m2  Height: 64 in  Weight: 260 lb  HR: 75  BP: 135/85 mmHg  ______________________________________________________________________________  ______________________________________________________________________________  Interpretation Summary     1. Normal left ventricular size and systolic performance with a visually  estimated ejection fraction of 55%.  2. There is mild aortic insufficiency.  3. There is a documented bioprosthetic pulmonic valve.  Â  The mean gradient is measured at 13 mmHg with peak velocity of 2.4 m/s.  Â  There is trace pulmonic insufficiency which appears valvar.  4. Mild right ventricular enlargement with low normal right ventricular  systolic performance.  5. There is moderate left atrial enlargement. There is mild right atrial  enlargement.  6. There is mild aortic root enlargement.     The prior real-time echocardiogram could not be accessed from the digital  archive for direct comparison.  ______________________________________________________________________________  Left ventricle:  Normal left ventricular size and systolic performance  with a visually  estimated ejection fraction of 55%. There is normal regional wall motion. Left  ventricular wall thickness is normal..     Assessment of LV Diastolic Function: The cumulative findings are indeterminate  in the evaluation of diastolic function [The septal e' velocity is < 7 cm/s &  lateral e' velocity is > 10 cm/s. The average E/e' is < 14. The TR velocity  cannot be determined due to insufficient tricuspid insufficiency signal. Left  atrial volume index is greater than 34 mL/mÂ ].     Right ventricle:  Mild right ventricular enlargement with low normal right ventricular systolic  performance.     Left atrium:  There is moderate left atrial enlargement.     Right atrium:  There is mild right atrial enlargement.     IVC:  The IVC is of normal caliber.     Aortic valve:  The aortic valve is comprised of three cusps. There is no significant aortic  stenosis. There is mild aortic insufficiency.     Mitral valve:  The mitral valve appears morphologically normal. There is trace mitral  insufficiency.     Tricuspid valve:  The tricuspid valve is grossly morphologically normal. There is trace  tricuspid insufficiency.     Pulmonic valve:  There is a documented bioprosthetic pulmonic valve. The mean gradient is  measured at 13 mmHg with peak velocity of 2.4 m/s. There is trace pulmonic  insufficiency which appears valvar.     Thoracic aorta:  There is mild aortic root enlargement.     Pericardium:  There is no significant pericardial effusion.  ______________________________________________________________________________     INTERPRETATION DATE:  10/16/2020     TEST DATE:  10/15/2020     INTERPRETATION:  Predominant rhythm is sinus rhythm with rates ranging from 66 beats  per minute to 152 beats per minute.  There was no significant bradycardia or pauses.   A nonspecific IVCD is present throughout the monitoring period.  Occasional atrial  premature beats were noted, 899.  There were several 3 to 5 beat runs  of primary  atrial tachycardia, average rate of approximately 150 beats per minute.  A moderate  number of ventricular premature beats were noted 1522 or 1% of all heartbeats.   There was some ventricular trigeminy present.  There were 32 couplets and 4  triplets.  The predominant PVC morphology had a right bundle configuration and  inferior axis consistent with benign ventricular ectopy from the outflow tract.  No  symptoms were described in the diary.     CONCLUSION:  Occasional atrial premature beats and short runs of atrial tachycardia  noted.  A moderate number of outflow tract PVCs were also noted.        LORENZO HOROWITZ MD  tn  D 10/16/2020 14:37:04  T 10/16/2020 15:24:15  R 10/16/2020 15:24:15  [id number]      Recent Coronary Angiogram Results:         Physical Examination  Review of Systems   Vitals: 115/70, weight 262 pounds, heart rate 73 and regular  Wt Readings from Last 3 Encounters:   11/29/21 117.9 kg (260 lb)   04/23/21 112.6 kg (248 lb 3.2 oz)   09/18/20 106.3 kg (234 lb 6.4 oz)       General Appearance:   no distress, normal body habitus   ENT/Mouth:  Facemask in place   EYES:  no scleral icterus, normal conjunctivae   Neck: no carotid bruits or thyromegaly   Chest/Lungs:   lungs are clear to auscultation, no rales or wheezing, well-healed sternal scar, equal chest wall expansion    Cardiovascular:   Regular. Normal first and second heart sounds with 3/6 systolic ; the carotid, radial and posterior tibial pulses are intact, Jugular venous pressure within normal limits, mild, improved edema bilaterally    Abdomen:  no organomegaly, masses, bruits, or tenderness; bowel sounds are present   Extremities: no cyanosis or clubbing   Skin: no xanthelasma, warm.    Neurologic: , no tremors     Psychiatric: alert and oriented x3, calm        Please refer above for cardiac ROS details.        Medical History  Surgical History Family History Social History   Past Medical History:   Diagnosis Date     Anemia      daily supplement.  no transfusion hx     Arthritis     OA     CHF (congestive heart failure) (H)     diastolic     Chronic diastolic heart failure (H)      Depression      Disease of thyroid gland      GERD (gastroesophageal reflux disease)      Hay fever      [place] (hard of hearing)      Hypertension      Menorrhagia with irregular cycle      Migraine      PAC (premature atrial contraction)      Polyarthralgia      PONV (postoperative nausea and vomiting)      Sleep apnea     CPAP     Tetralogy of Fallot      Past Surgical History:   Procedure Laterality Date     ARTHROPLASTY REVISION KNEE Left 10/29/2019    Procedure: REVISION TOTAL KNEE ARTHROPLASTY, LEFT TIBIAL REVISON;  Surgeon: Cezar Lockwood DO;  Location: [facility] OR;  Service: Orthopedics     ARTHROSCOPY KNEE       BUNIONECTOMY Bilateral      CHOLECYSTECTOMY       FOOT SURGERY       GASTRIC BYPASS       HYSTERECTOMY       JOINT REPLACEMENT Left     knee and revision     REPLACE VALVE MITRAL  2010     Z REPLACEMENT, PULMONARY VALVE  2010    Description: Pulmonary Valve Replacement;  Recorded: 02/20/2012;     C TOTAL KNEE ARTHROPLASTY Left 10/18/2016    Procedure: LEFT KNEE TOTAL ARTHROPLASTY;  Surgeon: Chucky Grove MD;  Location: [facility] OR;  Service: Orthopedics     [facility] TOTAL KNEE ARTHROPLASTY Right 2/27/2020    Procedure: RIGHT TOTAL KNEE ARTHROPLASTY;  Surgeon: Cezar Lockwood DO;  Location: [facility] OR;  Service: Orthopedics     Family History   Problem Relation Age of Onset     Heart Disease Father      Mental Illness Sister         Social History     Socioeconomic History     Marital status:      Spouse name: Not on file     Number of children: Not on file     Years of education: Not on file     Highest education level: Not on file   Occupational History     Not on file   Tobacco Use     Smoking status: Never Smoker     Smokeless tobacco: Never Used   Substance and Sexual Activity     Alcohol use: Yes     Comment:  Alcoholic Drinks/day: rare     Drug use: Yes     Types: Marijuana     Comment: Drug use: Medical marijuana started 9/19     Sexual activity: Not on file   Other Topics Concern     Not on file   Social History Narrative     Not on file     Social Determinants of Health     Financial Resource Strain: Not on file   Food Insecurity: Not on file   Transportation Needs: Not on file   Physical Activity: Not on file   Stress: Not on file   Social Connections: Not on file   Intimate Partner Violence: Not on file   Housing Stability: Not on file           Medications  Allergies   Current Outpatient Medications   Medication Sig Dispense Refill     acetaminophen (TYLENOL) 500 MG tablet [ACETAMINOPHEN (TYLENOL) 500 MG TABLET] Take 1,000 mg by mouth every 6 (six) hours as needed for pain.       albuterol (PROVENTIL HFA;VENTOLIN HFA) 90 mcg/actuation inhaler [ALBUTEROL (PROVENTIL HFA;VENTOLIN HFA) 90 MCG/ACTUATION INHALER] Inhale 2 puffs every 4 (four) hours as needed for wheezing or shortness of breath.        amoxicillin (AMOXIL) 500 MG capsule [AMOXICILLIN (AMOXIL) 500 MG CAPSULE] Take 2,000 mg by mouth as needed (prior to dental procedures).       CALCIUM CARBONATE/VITAMIN D3 (CALCIUM 600 + D,3, ORAL) [CALCIUM CARBONATE/VITAMIN D3 (CALCIUM 600 + D,3, ORAL)] Take 1 tablet by mouth 2 (two) times a day.       cholecalciferol, vitamin D3, 1,000 unit tablet [CHOLECALCIFEROL, VITAMIN D3, 1,000 UNIT TABLET] Take 1,000 Units by mouth every evening.        cyanocobalamin 500 MCG tablet [CYANOCOBALAMIN 500 MCG TABLET] Take 500 mcg by mouth daily.        cyclobenzaprine (FLEXERIL) 10 MG tablet [CYCLOBENZAPRINE (FLEXERIL) 10 MG TABLET] Take 10 mg by mouth 3 (three) times a day as needed for muscle spasms.       DULoxetine (CYMBALTA) 30 MG capsule [DULOXETINE (CYMBALTA) 30 MG CAPSULE] Take 30 mg by mouth at bedtime.       DULoxetine (CYMBALTA) 30 MG capsule [DULOXETINE (CYMBALTA) 30 MG CAPSULE] Take 60 mg by mouth every morning.         HYDROcodone-acetaminophen 5-325 mg per tablet [HYDROCODONE-ACETAMINOPHEN 5-325 MG PER TABLET] TAKE 1 2 TABS EVERY 6 8 HOURS AS NEEDED FOR CHRONIC PAIN. MAX 3/DAY.       levothyroxine (TIROSINT) 112 MCG capsule Take 1 capsule by mouth daily       losartan (COZAAR) 50 MG tablet [LOSARTAN (COZAAR) 50 MG TABLET] Take 50 mg by mouth 2 (two) times a day.        medical cannabis (Patient's own supply) [MEDICAL CANNABIS (PATIENT'S OWN SUPPLY)] 1 Dose by Other route see administration instructions. (The purpose of this order is to document that the patient reports taking medical cannabis. This is not a prescription, and is not used to certify that the patient has a  qualifying medical condition.)       multivitamin therapeutic (THERAGRAN) tablet [MULTIVITAMIN THERAPEUTIC (THERAGRAN) TABLET] Take 1 tablet by mouth 2 (two) times a day.       omeprazole (PRILOSEC) 20 MG capsule [OMEPRAZOLE (PRILOSEC) 20 MG CAPSULE] Take 20 mg by mouth daily.       potassium chloride SA (K-DUR,KLOR-CON) 20 MEQ tablet [POTASSIUM CHLORIDE SA (K-DUR,KLOR-CON) 20 MEQ TABLET] Take 20 mEq by mouth 4 (four) times a day.       spironolactone (ALDACTONE) 25 MG tablet Take 25 mg by mouth 2 times daily        triamterene-HCTZ (MAXZIDE-25) 37.5-25 MG tablet Take 1 tablet by mouth daily         Allergies   Allergen Reactions     Ibuprofen Itching and Swelling     Throat constriction and urticaria     Nsaids (Non-Steroidal Anti-Inflammatory Drug) [Nsaids] Anaphylaxis     Sulfa (Sulfonamide Antibiotics) [Sulfa Drugs] Rash          Lab Results    Chemistry/lipid CBC Cardiac Enzymes/BNP/TSH/INR   Recent Labs   Lab Test 06/17/19  1539   CHOL 157   HDL 57   LDL 81   TRIG 95     Recent Labs   Lab Test 06/17/19  1539 12/07/18  0741 08/24/17  0804   LDL 81 76 77     Recent Labs   Lab Test 03/30/22  1546      POTASSIUM 3.6   CHLORIDE 103   CO2 27   GLC 96   BUN 12   CR 0.66   GFRESTIMATED >90   ZACK 9.1     Recent Labs   Lab Test 03/30/22  1546 01/27/22  1158  12/29/21  0849   CR 0.66 0.68 0.73     No results for input(s): A1C in the last 07350 hours.       Recent Labs   Lab Test 02/28/20  0511 02/27/20  0630   WBC  --  9.0   HGB 12.6 14.7   HCT  --  45.0   MCV  --  89   PLT  --  228     Recent Labs   Lab Test 02/28/20  0511 02/27/20  0630 10/31/19  0605   HGB 12.6 14.7 11.8*    No results for input(s): TROPONINI in the last 42056 hours.  Recent Labs   Lab Test 12/22/21  1014 09/11/18  1212   BNP 89 52     Recent Labs   Lab Test 03/30/22  1546   TSH 0.35     Recent Labs   Lab Test 10/29/19  1122   INR 0.93        Johnny Yancey MD    Thank you for allowing me to participate in the care of your patient.      Sincerely,     Johnny Yancey MD     [facility]  cc:   Johnny Yancey MD  [address]  [address]  [place] [address]

## 2022-05-12 NOTE — PATIENT INSTRUCTIONS
We are going to plan a heart monitor. I will be in touch with results.Please call with any heart related questions.Her number is [phone]

## 2022-09-15 PROBLEM — I46.9 CARDIAC ARREST (H): Status: ACTIVE | Noted: 2022-01-01

## 2022-09-15 NOTE — TELEPHONE ENCOUNTER
----- Message from Genie Hawthorne sent at 9/15/2022 10:14 AM CDT -----  Regarding: [facility] PATIENT  General phone call:    Caller: KEVIN CHRISTIANSON RN FROM [facility]    Primary cardiologist: [facility]    Detailed reason for call: PLEASE TO GO OVER PATIENTS CARE, MEDS, LABS, ETC..    New or active symptoms? NO    Best phone number: [phone]    Best time to contact: ANY    Ok to leave a detailedmessage? YES    Device? NO    Additional Info:

## 2022-09-16 NOTE — PROGRESS NOTES
Pt transported to and from CT on a transport Vent on with no issue. VS were stable on FIO2 of 80%. We will continue to monitor.  Roselia Amador, RT  9/15/2022

## 2022-09-16 NOTE — PROGRESS NOTES
62F admitted after witnessed cardiac arrest on 9/15.  Patient with code event on morning of .  Taken emergently to cath lab for ECMO cannulation.  Patient successfully cannulated and was started on ECMO circuit.  Despite all resuscitative efforts, unable to achieve perfusing rhythm or pressure.  Patient  at 10:10 am.

## 2022-09-16 NOTE — PROGRESS NOTES
SPIRITUAL HEALTH SERVICES Progress Note     SH  ICU     Responded to code on unit. No family were present at the time. Nursing supervisor was in touch with family and will page if they arrive at [facility]. No other  needs were noted at this time.      ------  Moisés Solomon M.Div.  Resident   Pager: [phone]

## 2022-09-16 NOTE — PROCEDURES
[facility]    Arterial line placement    Date/Time: 9/16/2022 10:14 AM  Performed by: Al Galarza MD  Authorized by: Al Galarza MD       UNIVERSAL PROTOCOL   Site Marked: Yes  Prior Images Obtained and Reviewed:  Yes  Required items: Required blood products, implants, devices and special equipment available    Patient identity confirmed:  Arm band  NA - No sedation, light sedation, or local anesthesia  Confirmation Checklist:  Patient's identity using two indicators, relevant allergies, procedure was appropriate and matched the consent or emergent situation and correct equipment/implants were available  Time out: Immediately prior to the procedure a time out was called    Universal Protocol: the Joint Commission Universal Protocol was followed    Preparation: Patient was prepped and draped in usual sterile fashion    ESBL (mL):  2  Indication: multiple ABGs hemodynamic monitoring  Location: left radial      SEDATION    Patient Sedated: No      PROCEDURE DETAILS          Number of Attempts:  1  Post-procedure:  Line sutured and dressing applied      PROCEDURE    Patient Tolerance:  Patient tolerated the procedure well with no immediate complications  Length of time physician/provider present for 1:1 monitoring during sedation: 0      This procedure done as Emergency procedure  and no consent obtained

## 2022-09-16 NOTE — ED NOTES
Patient arrived via EMS after a witnessed cardiac arrest at [facility] where an AED was applied, three shocks were delivered prior to EMS arrival, one additional shock per EMS. Patient was give epi 1 mg x 3 and 300 amio per EMS as well, ROSC for approximately 4 minutes, then PEA en route, Paresh CPR in progress on arrival, subglottic airway in place per EMS.

## 2022-09-16 NOTE — PROGRESS NOTES
Pt arrived from ED approx. 0745. I was unable to obtain accurate/consistent pulse oximeter reading and BP readings- pulse verified carotids +. Titrated up vasopressors and gas/ new arterial line requested. Per Dr. Galarza, I began rewarming pt at 0.5 degrees/hr to new target of 34 degrees. Pt was initially settled and labs were collected. Once arterial line established- pt readings were low and correlated with cuff bp. vasopressors were titrated to achieve map while assisting staff grabbed calcium/bicarb for pt. Pt was noted to have vfib arrest started at 0900 (refer to Resuscitation Record). Pt was still coding while transferred to cath lab approx. 0935. Pt was cannulated and on pump at 0940 then pronounced by MD @ 1010

## 2022-09-16 NOTE — PROGRESS NOTES
Code blue was called.  Pt was taken off the vent and BMV with Resqpod until pt was taken to cath lab for EMCO.  Once pt was cannulated, pt was placed on the vent per MD.  Settings were CMV 12 450 100% p7.  Pt did have large amount of bloody secretions.   Pt  and was taken off the vent    Tay Gamino, RT  2022

## 2022-09-16 NOTE — PROGRESS NOTES
Intubation Note    Date of intubation: 9/15/22  Time of intubation: 1953  Location of intubation: stable 1  Intubated by: ED Doctor  Size of ETT: 8.0  Location (cm) at lip: 22@ lips    ETT placement confirmed by: color change and CXR  Plan: RT will continue to follow and monitor.

## 2022-09-16 NOTE — PROGRESS NOTES
Code Note:    62 year old female with tetralogy of fallot presented as an OSH cardiac arrest.      At 9 AM, she has worsening hypotension and quickly was in PEA arrest.  Chest compressions were started immediately.  1 amp bicarb, 1 g calcium chloride was given.  1 mg epinephrine was given shortly after.  On first pulse check, she had a strong pulse and blood pressure of 126/50.  However, she quickly went into pulseless VT/VF.  CPR was started, 300 mg amiodarone was given and she was defribrillated, but we could never get back a durable pulse.  Cardiology was at the bedside and arranged for emergent VA ECMO in the cath lab.  Paresh device was attached, and ACLS was continued.  100 mg lidocaine given.  Potassium noted to be 2.7.  She was give 20 mEq of potassium.  2 g magnesium. Another 150 mg of amiodarone given. She received a total of 7 mg of epinephrine.  The Paresh was provided good compressions and the end tidal CO2 was in the mid 40s.  She recevied 5 total defibrillations on the floor.  (120 J, 150 J, 200 J x 3). At 9:35, she was in the cath lab getting cannulated for VA ECMO. After the cannulas were placed, she has persistent hypotension.  Fluoro did not reveal a tension pneumothorax.  Albumin was given.  She was in persistent V fib. Another 200 J defibrillation was attempted.  After no improvement, she was declared dead at 10:10 AM.      65 minutes of critical care time.     Festus Young MD

## 2022-09-16 NOTE — H&P
[facility]    History and Physical  [facility] Intensive Care     Date of Admission:  9/15/2022    Assessment & Plan   Alma Rosa Mak is a 62 year old female who presents with witnessed cardiac arrest and syncope.  Multiple shocks prior to arrival to ED along with epinephrine. She does have quite a few rib fractures and small bilateral pneumothoraces, which are from the CPR.  She had an ECMO code activated, however no ECMO beds were available. She was trasnfered to ICU around 0715 intubated sedated with propofol and versed along with epinephrine and norepinephrine for further assessment and management.    This note written in retrospective after patient        Active Problems:    Cardiac arrest (H)    Neurology: Sedated with propofol and versed     Cardiovascular:  On pressors norepinephrine and epinephrine to keep MAP>65 mmHg   Was cooled to 31 C then rewarmed here with target of 34     Pulmonary:        intuabted Vent Mode: CMV/AC  (Continuous Mandatory Ventilation/ Assist Control)  FiO2 (%): 80 %  Resp Rate (Set): 16 breaths/min  Tidal Volume (Set, mL): 450 mL  PEEP (cm H2O): 5 cmH2O  Resp: 18    Renal  Conn disease with hypokalemia     ID:         Started on Zosyn for suspected pneumonia  Elevated lactic acid 5.9    Bilateral small pneumothorax and ribs fracture due to CPR prior to ICU admission     GI/Nutrition:   NPO    Endocrine:  Glucose 150 , ok    Heme/Onc:  Hgb 16.5 ,   WBC 22.2   To be observed   -    DVT Prophylaxis: Heparin SQ  GI Prophylaxis: PPI    Restraints: Restraints for medical healing needed: NO    Family update by me today: Yes  Updated by Dr. Hannah Carrillo MD      Code Status   Full Code    Chief Complaint   Cardiac arrest     Unable to obtain a history from the patient due to critical condition, intubation and sedation    History of Present Illness   This is a 62-year-old female brought in by EMS from the [facility] after she had a  cardiac arrest.  She had been shocked a total of 7 times prior to arrival and did receive epinephrine 3 times from EMS as well.  Unfortunately, she came in with ongoing CPR and in PEA.  Admitted to ICU intubated , sedated on pressors epinephrine and norepinephrine with  cooling catheter in the right femoral vein and  arterial line in the right femoral artery.  She was provided 2 amps of bicarb as well due to her very low pH.  She was provided IV Zosyn as well due to concern for sepsis and aspiration.   She does have quite a few rib fractures and small bilateral pneumothoraces, which are from the CPR.  Of note, the ECMO team had been activated by EMS prior to arrival, however there are no ECMO beds available.    HPI taken from emergency note as patient is critically ill and no family around.       Past Medical History    I have reviewed this patient's medical history and updated it with pertinent information if needed.   Past Medical History:   Diagnosis Date     Anemia     daily supplement.  no transfusion hx     Arthritis     OA     CHF (congestive heart failure) (H)     diastolic     Chronic diastolic heart failure (H)      Depression      Disease of thyroid gland      GERD (gastroesophageal reflux disease)      Hay fever      [place] (hard of hearing)      Hypertension      Menorrhagia with irregular cycle      Migraine      PAC (premature atrial contraction)      Polyarthralgia      PONV (postoperative nausea and vomiting)      Sleep apnea     CPAP     Tetralogy of Fallot        Past Surgical History   I have reviewed this patient's surgical history and updated it with pertinent information if needed.  Past Surgical History:   Procedure Laterality Date     ARTHROPLASTY REVISION KNEE Left 10/29/2019    Procedure: REVISION TOTAL KNEE ARTHROPLASTY, LEFT TIBIAL REVISON;  Surgeon: Cezar Lockwood DO;  Location: [facility];  Service: Orthopedics     ARTHROSCOPY KNEE       BUNIONECTOMY Bilateral      CHOLECYSTECTOMY        FOOT SURGERY       GASTRIC BYPASS       HYSTERECTOMY       JOINT REPLACEMENT Left     knee and revision     REPLACE VALVE MITRAL  2010     ZZC REPLACEMENT, PULMONARY VALVE  2010    Description: Pulmonary Valve Replacement;  Recorded: 02/20/2012;     ZZC TOTAL KNEE ARTHROPLASTY Left 10/18/2016    Procedure: LEFT KNEE TOTAL ARTHROPLASTY;  Surgeon: Chucky Grove MD;  Location: [facility];  Service: Orthopedics     [facility] TOTAL KNEE ARTHROPLASTY Right 2/27/2020    Procedure: RIGHT TOTAL KNEE ARTHROPLASTY;  Surgeon: Cezar Lockwood DO;  Location: [facility];  Service: Orthopedics       Prior to Admission Medications   Prior to Admission Medications   Prescriptions Last Dose Informant Patient Reported? Taking?   CALCIUM CARBONATE/VITAMIN D3 (CALCIUM 600 + D,3, ORAL)   Yes No   Sig: [CALCIUM CARBONATE/VITAMIN D3 (CALCIUM 600 + D,3, ORAL)] Take 1 tablet by mouth 2 (two) times a day.   DULoxetine (CYMBALTA) 30 MG capsule   Yes No   Sig: [DULOXETINE (CYMBALTA) 30 MG CAPSULE] Take 60 mg by mouth every morning.    DULoxetine (CYMBALTA) 30 MG capsule   Yes No   Sig: [DULOXETINE (CYMBALTA) 30 MG CAPSULE] Take 30 mg by mouth at bedtime.   HYDROcodone-acetaminophen 5-325 mg per tablet   Yes No   Sig: [HYDROCODONE-ACETAMINOPHEN 5-325 MG PER TABLET] TAKE 1 2 TABS EVERY 6 8 HOURS AS NEEDED FOR CHRONIC PAIN. MAX 3/DAY.   acetaminophen (TYLENOL) 500 MG tablet   Yes No   Sig: [ACETAMINOPHEN (TYLENOL) 500 MG TABLET] Take 1,000 mg by mouth every 6 (six) hours as needed for pain.   albuterol (PROVENTIL HFA;VENTOLIN HFA) 90 mcg/actuation inhaler   Yes No   Sig: [ALBUTEROL (PROVENTIL HFA;VENTOLIN HFA) 90 MCG/ACTUATION INHALER] Inhale 2 puffs every 4 (four) hours as needed for wheezing or shortness of breath.    amoxicillin (AMOXIL) 500 MG capsule   Yes No   Sig: [AMOXICILLIN (AMOXIL) 500 MG CAPSULE] Take 2,000 mg by mouth as needed (prior to dental procedures).   cholecalciferol, vitamin D3, 1,000 unit tablet   Yes No   Sig:  [CHOLECALCIFEROL, VITAMIN D3, 1,000 UNIT TABLET] Take 1,000 Units by mouth every evening.    cyanocobalamin 500 MCG tablet   Yes No   Sig: [CYANOCOBALAMIN 500 MCG TABLET] Take 500 mcg by mouth daily.    cyclobenzaprine (FLEXERIL) 10 MG tablet   Yes No   Sig: [CYCLOBENZAPRINE (FLEXERIL) 10 MG TABLET] Take 10 mg by mouth 3 (three) times a day as needed for muscle spasms.   levothyroxine (TIROSINT) 112 MCG capsule   Yes No   Sig: Take 1 capsule by mouth daily   losartan (COZAAR) 50 MG tablet   Yes No   Sig: Take 50 mg by mouth daily   medical cannabis (Patient's own supply)   Yes No   Sig: [MEDICAL CANNABIS (PATIENT'S OWN SUPPLY)] 1 Dose by Other route see administration instructions. (The purpose of this order is to document that the patient reports taking medical cannabis. This is not a prescription, and is not used to certify that the patient has a  qualifying medical condition.)   multivitamin therapeutic (THERAGRAN) tablet   Yes No   Sig: [MULTIVITAMIN THERAPEUTIC (THERAGRAN) TABLET] Take 1 tablet by mouth 2 (two) times a day.   triamterene-HCTZ (MAXZIDE-25) 37.5-25 MG tablet   Yes No   Sig: Take 1 tablet by mouth daily      Facility-Administered Medications: None     Allergies   Allergies   Allergen Reactions     Ibuprofen Itching and Swelling     Throat constriction and urticaria     Nsaids (Non-Steroidal Anti-Inflammatory Drug) [Nsaids] Anaphylaxis     Sulfa (Sulfonamide Antibiotics) [Sulfa Drugs] Rash       Social History   I have reviewed this patient's social history and updated it with pertinent information if needed. Alma Rosa Mak  reports that she has never smoked. She has never used smokeless tobacco. She reports current alcohol use. She reports current drug use. Drug: Marijuana.    Family History   I have reviewed this patient's family history and updated it with pertinent information if needed.   Family History   Problem Relation Age of Onset     Heart Disease Father      Mental Illness Sister         Review of Systems   The 10 point Review of Systems is negative other than noted in the HPI or here.     Physical Exam   Temp: (!) 88.2  F (31.2  C)   Temp  Min: 88.2  F (31.2  C)  Max: 96.8  F (36  C) BP: 116/72 Pulse: 82   Resp: 18 SpO2: 98 % O2 Device: Mechanical Ventilator    Vital Signs with Ranges  Temp:  [88.2  F (31.2  C)-96.8  F (36  C)] 88.2  F (31.2  C)  Pulse:  [] 82  Resp:  [6-25] 18  BP: ()/() 116/72  MAP:  [50 mmHg-98 mmHg] 81 mmHg  Arterial Line BP: ()/(46-82) 52/46  FiO2 (%):  [50 %-100 %] 80 %  SpO2:  [85 %-100 %] 98 %  241 lbs 9.99 oz    Constitutional: Sedated, unarousable, appears stated age and morbidly obese  Eyes: pupilary abnormality to include bilaterally dilated with minimal reaction   Respiratory: no increased work of breathing, good air exchange and clear to auscultation  Cardiovascular:post cardicarrest noted and no edema  GI: scars noted hypogastric transverse, absent bowel sounds, distended and hernia present Umbilical.  Skin: normal skin color, texture, turgor  Neurologic: Sedated , comatose , pupils dilated slightly reacting . Not responding to pain stimulation. No gag reflux by suctioning of ET tube     Data   Results for orders placed or performed during the hospital encounter of 09/15/22 (from the past 24 hour(s))   CBC with Platelets & Differential    Narrative    The following orders were created for panel order CBC with Platelets & Differential.  Procedure                               Abnormality         Status                     ---------                               -----------         ------                     CBC with platelets and d...[[id number]]  Abnormal            Final result               Manual Differential[[id number]]          Abnormal            Final result                 Please view results for these tests on the individual orders.   Basic metabolic panel   Result Value Ref Range    Sodium 142 133 - 144 mmol/L    Potassium 4.1  3.4 - 5.3 mmol/L    Chloride 104 94 - 109 mmol/L    Carbon Dioxide (CO2) 18 (L) 20 - 32 mmol/L    Anion Gap 20 (H) 3 - 14 mmol/L    Urea Nitrogen 16 7 - 30 mg/dL    Creatinine 0.81 0.52 - 1.04 mg/dL    Calcium 9.0 8.5 - 10.1 mg/dL    Glucose 390 (H) 70 - 99 mg/dL    GFR Estimate 82 >60 mL/min/1.73m2   Troponin I   Result Value Ref Range    Troponin I High Sensitivity 82 (H) <54 ng/L   [place] Draw    Narrative    The following orders were created for panel order [place] Draw.  Procedure                               Abnormality         Status                     ---------                               -----------         ------                     Extra Blue Top Tube[[id number]]                              Final result               Extra Red Top Tube[[id number]]                                                            Please view results for these tests on the individual orders.   CBC with platelets and differential   Result Value Ref Range    WBC Count 12.3 (H) 4.0 - 11.0 10e3/uL    RBC Count 4.90 3.80 - 5.20 10e6/uL    Hemoglobin 14.1 11.7 - 15.7 g/dL    Hematocrit 48.5 (H) 35.0 - 47.0 %    MCV 99 78 - 100 fL    MCH 28.8 26.5 - 33.0 pg    MCHC 29.1 (L) 31.5 - 36.5 g/dL    RDW 15.9 (H) 10.0 - 15.0 %    Platelet Count 198 150 - 450 10e3/uL   Extra Blue Top Tube   Result Value Ref Range    Hold Specimen JIC    Extra Tube ([place] Draw)    Narrative    The following orders were created for panel order Extra Tube ([place] Draw).  Procedure                               Abnormality         Status                     ---------                               -----------         ------                     Extra Green Top (Lithium...[[id number]]                      Final result                 Please view results for these tests on the individual orders.   Extra Green Top (Lithium Heparin) Tube   Result Value Ref Range    Hold Specimen JIC    INR   Result Value Ref Range    INR 1.30 (H) 0.85 - 1.15   Partial thromboplastin  time   Result Value Ref Range    aPTT 43 (H) 22 - 38 Seconds   Magnesium   Result Value Ref Range    Magnesium 2.9 (H) 1.6 - 2.3 mg/dL   TSH with free T4 reflex   Result Value Ref Range    TSH 0.89 0.40 - 4.00 mU/L   Hepatic panel   Result Value Ref Range    Bilirubin Total 0.2 0.2 - 1.3 mg/dL    Bilirubin Direct <0.1 0.0 - 0.2 mg/dL    Protein Total 6.5 (L) 6.8 - 8.8 g/dL    Albumin 3.0 (L) 3.4 - 5.0 g/dL    Alkaline Phosphatase 112 40 - 150 U/L     (HH) 0 - 45 U/L     (H) 0 - 50 U/L   Manual Differential   Result Value Ref Range    % Neutrophils 22 %    % Lymphocytes 60 %    % Monocytes 9 %    % Eosinophils 1 %    % Basophils 0 %    % Metamyelocytes 3 %    % Myelocytes 5 %    NRBCs per 100 WBC 1 (H) <=0 %    Absolute Neutrophils 2.7 1.6 - 8.3 10e3/uL    Absolute Lymphocytes 7.4 (H) 0.8 - 5.3 10e3/uL    Absolute Monocytes 1.1 0.0 - 1.3 10e3/uL    Absolute Eosinophils 0.1 0.0 - 0.7 10e3/uL    Absolute Basophils 0.0 0.0 - 0.2 10e3/uL    Absolute Metamyelocytes 0.4 (H) <=0.0 10e3/uL    Absolute Myelocytes 0.6 (H) <=0.0 10e3/uL    Absolute NRBCs 0.1 (H) <=0.0 10e3/uL    RBC Morphology Confirmed RBC Indices     Platelet Assessment  Automated Count Confirmed. Platelet morphology is normal.     Automated Count Confirmed. Platelet morphology is normal.   Nt probnp inpatient (BNP)   Result Value Ref Range    N terminal Pro BNP Inpatient 575 0 - 900 pg/mL   Erythrocyte sedimentation rate auto   Result Value Ref Range    Erythrocyte Sedimentation Rate 8 0 - 30 mm/hr   CRP inflammation   Result Value Ref Range    CRP Inflammation <2.9 0.0 - 8.0 mg/L   Procalcitonin   Result Value Ref Range    Procalcitonin <0.05 <0.05 ng/mL   -Intubation    Narrative    Justice Martin MD     9/16/2022 12:07 AM  [facility]    -Intubation    Date/Time: 9/15/2022 7:51 PM  Performed by: Justice Martin MD  Authorized by: Justice Martin MD     Emergent condition/consent  implied      PRE-PROCEDURE DETAILS     Patient status:  Unresponsive    Mallampati score:  II    Pretreatment medications:  None    Paralytics:  None      PROCEDURE DETAILS     Preoxygenation:  KARELY/LMA    CPR in progress: yes      Intubation method:  Oral    Oral intubation technique:  Direct    Laryngoscope blade:  Mac 4    Tube size (mm):  7.0    Tube type:  Cuffed    Number of attempts:  2    Ventilation between attempts: no      Cricoid pressure: yes      Tube visualized through cords: yes      PLACEMENT ASSESSMENT     ETT to lip:  24    Tube secured with:  ETT guadalupe    Breath sounds:  Equal    Placement verification: chest rise, CXR verification, direct   visualization, ETCO2 detector and tube exhalation      CXR findings:  ETT in proper place    PROCEDURE    Patient Tolerance:  Patient tolerated the procedure well with no immediate   complications   iStat Gases Electrolytes Venous, POCT   Result Value Ref Range    CPB Applied No     Hematocrit POCT 46 35 - 47 %    Bicarbonate Venous POCT 19 (L) 21 - 28 mmol/L    Hemoglobin POCT 15.6 11.7 - 15.7 g/dL    Potassium POCT 4.7 3.4 - 5.3 mmol/L    Sodium POCT 141 133 - 144 mmol/L    pCO2 Venous POCT 109 (HH) 40 - 50 mm Hg    pH Venous POCT 6.84 (LL) 7.32 - 7.43    pO2 Venous POCT 20 (L) 25 - 47 mm Hg    O2 Sat, Venous POCT 11 (L) 94 - 100 %   -Central Line    Narrative    Justice Martin MD     9/16/2022 12:07 AM  [facility]    -Central Line    Date/Time: 9/15/2022 8:07 PM  Performed by: Justice Martin MD  Authorized by: Justice Martin MD     Emergent condition/consent implied      PRE-PROCEDURE DETAILS:     Hand hygiene: Hand hygiene performed prior to insertion      Sterile barrier technique: All elements of maximal sterile technique   followed      Skin preparation:  2% chlorhexidine    Skin preparation agent: Skin preparation agent completely dried prior to   procedure      PROCEDURE DETAILS:     Location:  R femoral     Patient position:  Flat    Procedural supplies:  Triple lumen    Landmarks identified: yes      Ultrasound guidance: no      Number of attempts:  2    Successful placement: yes      POST PROCEDURE DETAILS:      Post-procedure:  Dressing applied and line sutured    Assessment:  Blood return through all ports and free fluid flow    PROCEDURE    Patient Tolerance:  Patient tolerated the procedure well with no immediate   complications   EKG 12-lead, tracing only   Result Value Ref Range    Systolic Blood Pressure  mmHg    Diastolic Blood Pressure  mmHg    Ventricular Rate 104 BPM    Atrial Rate 104 BPM    NV Interval 148 ms    QRS Duration 160 ms     ms    QTc 510 ms    P Axis 77 degrees    R AXIS -28 degrees    T Axis 85 degrees    Interpretation ECG       Sinus tachycardia with Premature supraventricular complexes and with occasional Premature ventricular complexes and Fusion complexes  Right bundle branch block  Abnormal ECG  When compared with ECG of 15-SEP-2022 20:01, (unconfirmed)  Fusion complexes are now Present  Premature ventricular complexes are now Present  Premature supraventricular complexes are now Present  Confirmed by GENERATED REPORT, COMPUTER ([id number]),  Katia Reese ([id number]) on 9/15/2022 10:43:25 PM     ABO/Rh type and screen *Canceled*    Narrative    The following orders were created for panel order ABO/Rh type and screen.  Procedure                               Abnormality         Status                     ---------                               -----------         ------                       Please view results for these tests on the individual orders.   XR Chest Port 1 View    Narrative    EXAM: XR CHEST PORT 1 VIEW  LOCATION: [facility]  DATE/TIME: 9/15/2022 9:04 PM    INDICATION: post intubation  COMPARISON: 2/17/2012      Impression    IMPRESSION: Cardiomegaly. Pulmonary vascularity normal. Hazy airspace opacities within the right mid and upper lung field  and the left upper lobe concerning for pneumonia. The lung bases are clear. No effusions. Median sternotomy. Endotracheal tube tip   in satisfactory position 3.5 cm above the evelyn. Enteric tube tip extends into the proximal stomach. Gas distended bowel loops upper abdomen.   CT Head w/o Contrast    Narrative    EXAM: CT HEAD W/O CONTRAST  LOCATION: [facility]  DATE/TIME: 9/15/2022 9:42 PM    INDICATION: cardiac arrest, confusion  COMPARISON: CT head 1/20/2011  TECHNIQUE: Routine CT Head without IV contrast. Multiplanar reformats. Dose reduction techniques were used.    FINDINGS:  INTRACRANIAL CONTENTS: No intracranial hemorrhage, extraaxial collection, or mass effect.  No CT evidence of acute infarct. Normal parenchymal attenuation. Mild generalized volume loss. No hydrocephalus.     VISUALIZED ORBITS/SINUSES/MASTOIDS: No intraorbital abnormality. Partial opacification of the paranasal sinuses with air-fluid level in the bilateral sphenoid sinuses No middle ear or mastoid effusion.    BONES/SOFT TISSUES: No acute abnormality.      Impression    IMPRESSION:  1.  No acute intracranial abnormality. No CT evidence of hypoxic ischemic injury.   CT Chest (PE) Abdomen Pelvis w Contrast    Narrative    EXAM: CT CHEST PE ABDOMEN PELVIS W CONTRAST  LOCATION: [facility]  DATE/TIME: 9/15/2022 9:44 PM    INDICATION: cardiac arrest, PE, aortic aneurysm?  COMPARISON: Chest radiograph today. CT abdomen and pelvis 02/02/2016.  TECHNIQUE: CT chest pulmonary angiogram and routine CT abdomen pelvis with IV contrast. Arterial phase through the chest and venous phase through the abdomen and pelvis. Multiplanar reformats and MIP reconstructions were performed. Dose reduction   techniques were used.   CONTRAST: 132 mL Isovue 370    FINDINGS:  ANGIOGRAM CHEST: No pulmonary embolus. Postoperative changes of pulmonic valve repair. Large size of the central pulmonary arteries consistent  with pulmonary arterial hypertension. No aortic aneurysm or dissection.    LUNGS AND PLEURA: Tiny bilateral pneumothoraces. Areas of dense consolidation of the dependent and perihilar portions of both upper and lower lobes. Background generalized patchy groundglass alveolar opacity throughout both lungs. Interlobular septal   thickening in the upper lobes.    MEDIASTINUM/AXILLAE: Endotracheal tube in satisfactory position. Nasogastric tube tip in the stomach. Enlarged heart. Poststernotomy.    CORONARY ARTERY CALCIFICATION: Mild.    HEPATOBILIARY: The complex cystic area in the right hepatic lobe has decreased in size from 02/02/2016, now measuring 3.4 cm, previously 6.9 cm. Previously this had a multilocular appearance. Now it appears unilocular. Postcholecystectomy. Extrahepatic   biliary dilatation with the common duct measuring up to 15 mm probably on the basis of reservoir effect after cholecystectomy.    PANCREAS: Partial fatty replacement of the pancreas.    SPLEEN: Normal.    ADRENAL GLANDS: Left adrenal gland is no longer reliably visualized and may be surgically absent. Normal right adrenal gland.    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 Normal right kidney and bladder. Doyle catheter in the bladder.    BOWEL: Post gastric bypass. Generalized dilatation of small bowel but no transition point compatible with ileus. No bowel inflammation. Normal appendix.    LYMPH NODES: Normal.    VASCULATURE: Right common femoral venous catheter terminates in the infrarenal IVC. There is an additional small bore catheter which passes lateral to the right common femoral artery and extends to termination in the soft tissues medial to the lesser   trochanter of the right femur. Small amount of hyperdense fluid in the subcutaneous fat of the right groin superficial to the common femoral vessels consistent with late intravenous contrast and active bleeding which appears to  arise from the right   common femoral vein. (images 210-240 of series 12)    PELVIC ORGANS: Normal.    MUSCULOSKELETAL: Acute nondisplaced fractures of the right anterior second through seventh ribs and left anterior second through sixth ribs. Degenerative changes of the spine.      Impression    IMPRESSION:  1.  No pulmonary embolus, aortic aneurysm or aortic dissection.  2.  Postoperative changes of pulmonic valve repair. Large size of the central pulmonary arteries consistent with pulmonary arterial hypertension.  3.  Tiny bilateral pneumothoraces.  4.  Acute nondisplaced fractures of the right anterior second through seventh ribs and left anterior second through sixth ribs.  5.  Findings consistent with a combination of pulmonary edema and bilateral aspiration pneumonitis.  6.  Generalized dilatation of small bowel without transition point consistent with ileus.  7.  Right common femoral venous catheter terminates in the infrarenal IVC.  8.  Additional small bore catheter of the right groin appears malpositioned and passes lateral to the right common femoral artery to a termination in the soft tissues medial to the lesser trochanter of the right femur.  9.  Evidence for active bleeding in the right groin likely arising from the right common femoral vein.    I discussed the findings with Dr. Martin of the ER at 10:40 PM on 09/15/2022.   ABO/Rh type and screen *Canceled*    Narrative    The following orders were created for panel order ABO/Rh type and screen.  Procedure                               Abnormality         Status                     ---------                               -----------         ------                     Adult Type and Screen[[id number]]                                                         Please view results for these tests on the individual orders.   Extra Tube    Narrative    The following orders were created for panel order Extra Tube.  Procedure                                Abnormality         Status                     ---------                               -----------         ------                     Extra Purple Top Tube[[id number]]                            Final result                 Please view results for these tests on the individual orders.   Extra Purple Top Tube   Result Value Ref Range    Hold Specimen JIC    Asymptomatic COVID-19 Virus (Coronavirus) by PCR Nasopharyngeal    Specimen: Nasopharyngeal; Swab   Result Value Ref Range    SARS CoV2 PCR Negative Negative    Narrative    Testing was performed using the [employer] Xpress SARS-CoV-2 Assay on the   [employer]Xpert Instrument Systems. Additional information about   this Emergency Use Authorization (EUA) assay can be found via the Lab   Guide. This test should be ordered for the detection of SARS-CoV-2 in   individuals who meet SARS-CoV-2 clinical and/or epidemiological   criteria. Test performance is unknown in asymptomatic patients. This   test is for in vitro diagnostic use under the FDA EUA for   laboratories certified under CLIA to perform high complexity testing.   This test has not been FDA cleared or approved. A negative result   does not rule out the presence of PCR inhibitors in the specimen or   target RNA in concentration below the limit of detection for the   assay. The possibility of a false negative should be considered if   the patient's recent exposure or clinical presentation suggests   COVID-19. This test was validated by the [facility]. This laboratory is certified under the Clinical Laboratory Improvement Amendments of 1988 (CLIA-88) as qualified to perform high complexity laboratory testing.     UA with Microscopic reflex to Culture    Specimen: Urine, Catheter   Result Value Ref Range    Color Urine Yellow Colorless, Straw, Light Yellow, Yellow    Appearance Urine Slightly Cloudy (A) Clear    Glucose Urine 1000  (A) Negative mg/dL    Bilirubin Urine Negative  Negative    Ketones Urine Trace (A) Negative mg/dL    Specific Gravity Urine 1.031 1.003 - 1.035    Blood Urine Large (A) Negative    pH Urine 7.0 5.0 - 7.0    Protein Albumin Urine 200  (A) Negative mg/dL    Urobilinogen Urine Normal Normal, 2.0 mg/dL    Nitrite Urine Negative Negative    Leukocyte Esterase Urine Negative Negative    Bacteria Urine Moderate (A) None Seen /HPF    RBC Urine 32 (H) <=2 /HPF    WBC Urine 25 (H) <=5 /HPF    Squamous Epithelials Urine 2 (H) <=1 /HPF    Hyaline Casts Urine 5 (H) <=2 /LPF    Narrative    Urine Culture ordered based on laboratory criteria   iStat Gases (lactate) arterial, POCT   Result Value Ref Range    Bicarbonate Arterial POCT 19 (L) 21 - 28 mmol/L    Lactic Acid POCT 9.3 (HH) <=2.0 mmol/L    O2 Sat, Arterial POCT 95 92 - 100 %    pCO2 Arterial POCT 48 (H) 35 - 45 mm Hg    PH Arterial POCT 7.21 (L) 7.35 - 7.45    pO2 Arterial POCT 91 80 - 105 mm Hg   Glucose by meter   Result Value Ref Range    GLUCOSE BY METER POCT 299 (H) 70 - 99 mg/dL   Electrolyte panel   Result Value Ref Range    Sodium 144 133 - 144 mmol/L    Potassium 2.5 (LL) 3.4 - 5.3 mmol/L    Chloride 111 (H) 94 - 109 mmol/L    Carbon Dioxide (CO2) 19 (L) 20 - 32 mmol/L    Anion Gap 14 3 - 14 mmol/L   Magnesium   Result Value Ref Range    Magnesium 3.3 (H) 1.6 - 2.3 mg/dL   Glucose by meter   Result Value Ref Range    GLUCOSE BY METER POCT 243 (H) 70 - 99 mg/dL   Glucose by meter   Result Value Ref Range    GLUCOSE BY METER POCT 243 (H) 70 - 99 mg/dL   Glucose by meter   Result Value Ref Range    GLUCOSE BY METER POCT 218 (H) 70 - 99 mg/dL   INR   Result Value Ref Range    INR 1.47 (H) 0.85 - 1.15   Partial thromboplastin time   Result Value Ref Range    aPTT 30 22 - 38 Seconds   Fibrinogen activity   Result Value Ref Range    Fibrinogen Activity 312 170 - 490 mg/dL   Glucose by meter   Result Value Ref Range    GLUCOSE BY METER POCT 192 (H) 70 - 99 mg/dL   XR Chest Port 1 View    Narrative    EXAM: XR CHEST  PORT 1 VIEW  LOCATION: [facility]  DATE/TIME: 9/16/2022 6:14 AM    INDICATION: Intubated patient.  COMPARISON: 09/15/2022 at 9:00 PM      Impression    IMPRESSION: Endotracheal tube remains in good position in mid trachea. Enteric tube remains in good position in the stomach. Sternotomy. Cardiac enlargement unchanged. Interval increase in bilateral pulmonary alveolar infiltrates and pulmonary vascular   congestion. These findings can be seen with edema or bilateral pneumonia with edema.   CBC with platelets + differential *Canceled*    Narrative    The following orders were created for panel order CBC with platelets + differential.  Procedure                               Abnormality         Status                     ---------                               -----------         ------                       Please view results for these tests on the individual orders.   CBC with platelets + differential    Narrative    The following orders were created for panel order CBC with platelets + differential.  Procedure                               Abnormality         Status                     ---------                               -----------         ------                     CBC with platelets and d...[[id number]]  Abnormal            Final result                 Please view results for these tests on the individual orders.   [place] Draw    Narrative    The following orders were created for panel order [place] Draw.  Procedure                               Abnormality         Status                     ---------                               -----------         ------                     Extra Purple Top Tube[[id number]]                            Final result                 Please view results for these tests on the individual orders.   Extra Purple Top Tube   Result Value Ref Range    Hold Specimen     CBC with platelets and differential   Result Value Ref Range    WBC Count 22.2 (H) 4.0 -  11.0 10e3/uL    RBC Count 5.75 (H) 3.80 - 5.20 10e6/uL    Hemoglobin 16.5 (H) 11.7 - 15.7 g/dL    Hematocrit 53.4 (H) 35.0 - 47.0 %    MCV 93 78 - 100 fL    MCH 28.7 26.5 - 33.0 pg    MCHC 30.9 (L) 31.5 - 36.5 g/dL    RDW 16.4 (H) 10.0 - 15.0 %    Platelet Count 250 150 - 450 10e3/uL    % Neutrophils 86 %    % Lymphocytes 10 %    % Monocytes 3 %    % Eosinophils 0 %    % Basophils 0 %    % Immature Granulocytes 1 %    NRBCs per 100 WBC 0 <1 /100    Absolute Neutrophils 19.2 (H) 1.6 - 8.3 10e3/uL    Absolute Lymphocytes 2.2 0.8 - 5.3 10e3/uL    Absolute Monocytes 0.6 0.0 - 1.3 10e3/uL    Absolute Eosinophils 0.0 0.0 - 0.7 10e3/uL    Absolute Basophils 0.1 0.0 - 0.2 10e3/uL    Absolute Immature Granulocytes 0.2 <=0.4 10e3/uL    Absolute NRBCs 0.0 10e3/uL   Glucose by meter   Result Value Ref Range    GLUCOSE BY METER POCT 79 70 - 99 mg/dL   Glucose by meter   Result Value Ref Range    GLUCOSE BY METER POCT 170 (H) 70 - 99 mg/dL   Electrolyte panel   Result Value Ref Range    Sodium 144 133 - 144 mmol/L    Potassium 2.7 (L) 3.4 - 5.3 mmol/L    Chloride 113 (H) 94 - 109 mmol/L    Carbon Dioxide (CO2) 14 (L) 20 - 32 mmol/L    Anion Gap 17 (H) 3 - 14 mmol/L   Glucose by meter   Result Value Ref Range    GLUCOSE BY METER POCT 164 (H) 70 - 99 mg/dL   EKG 12-lead, tracing only   Result Value Ref Range    Systolic Blood Pressure  mmHg    Diastolic Blood Pressure  mmHg    Ventricular Rate 86 BPM    Atrial Rate 86 BPM    DE Interval 186 ms    QRS Duration 174 ms     ms    QTc 579 ms    P Axis 29 degrees    R AXIS 105 degrees    T Axis 136 degrees    Interpretation ECG       Sinus rhythm with sinus arrhythmia with occasional Premature ventricular complexes and Fusion complexes  Right bundle branch block  T wave abnormality, consider lateral ischemia  Abnormal ECG  When compared with ECG of 15-SEP-2022 20:30,  Premature supraventricular complexes are no longer Present  QRS axis Shifted right  T wave inversion more evident  in Anterior leads  QT has lengthened     Electrolyte panel   Result Value Ref Range    Sodium 147 (H) 133 - 144 mmol/L    Potassium 2.4 (LL) 3.4 - 5.3 mmol/L    Chloride 113 (H) 94 - 109 mmol/L    Carbon Dioxide (CO2) 21 20 - 32 mmol/L    Anion Gap 13 3 - 14 mmol/L   Blood gas venous with oxyhemoglobin   Result Value Ref Range    pH Venous 7.06 (LL) 7.32 - 7.43    pCO2 Venous 73 (H) 40 - 50 mm Hg    pO2 Venous 21 (L) 25 - 47 mm Hg    Bicarbonate Venous 21 21 - 28 mmol/L    FIO2 40     Oxyhemoglobin Venous 24 (L) 70 - 75 %    Base Excess/Deficit (+/-) -11.5 (L) -7.7 - 1.9 mmol/L   Lactic acid whole blood   Result Value Ref Range    Lactic Acid 5.9 (HH) 0.7 - 2.0 mmol/L   Glucose by meter   Result Value Ref Range    GLUCOSE BY METER POCT 150 (H) 70 - 99 mg/dL   iStat Gases (Electrolytes) arterial POCT   Result Value Ref Range    CPB Applied No     Hematocrit POCT 43 35 - 47 %    Bicarbonate Arterial POCT 27 21 - 28 mmol/L    Hemoglobin POCT 14.6 11.7 - 15.7 g/dL    Potassium POCT 2.7 (L) 3.4 - 5.3 mmol/L    Sodium POCT 151 (H) 133 - 144 mmol/L    pCO2 Arterial POCT 62 (H) 35 - 45 mm Hg    pH Arterial POCT 7.24 (L) 7.35 - 7.45    pO2 Arterial POCT 76 (L) 80 - 105 mm Hg    O2 Sat, Arterial POCT 92 92 - 100 %   iStat Gases (lactate) arterial, POCT   Result Value Ref Range    Bicarbonate Arterial POCT 28 21 - 28 mmol/L    Lactic Acid POCT 10.5 (HH) <=2.0 mmol/L    O2 Sat, Arterial POCT 80 (L) 92 - 100 %    pCO2 Arterial POCT 87 (HH) 35 - 45 mm Hg    PH Arterial POCT 7.11 (LL) 7.35 - 7.45    pO2 Arterial POCT 61 (L) 80 - 105 mm Hg

## 2022-09-16 NOTE — PROGRESS NOTES
MD DEATH PRONOUNCEMENT    Called to pronounce Alma Rosa Mak dead.    Physical Exam: Unresponsive to noxious stimuli, Spontaneous respirations absent, Carotid pulse absent, Heart sounds absent, Pupillary light reflex absent and Corneal blink reflex absent    Patient was pronounced dead at 10:10 AM, 2022.    Preliminary Cause of Death: Ventricular Tachycardia    Active Problems:    Cardiac arrest (H)       Infectious disease present?: NO    Communicable disease present? (examples: HIV, chicken pox, TB, Ebola, CJD) :  NO    Multi-drug resistant organism present? (example: MRSA): NO    Please consider an autopsy if any of the following exist:  NO Unexpected or unexplained death during or following any dental, medical, or surgical diagnostic treatment procedures.   NO Death of mother at or up to seven days after delivery.     NO All  and pediatric deaths.     NO Death where the cause is sufficiently obscure to delay completion of the death certificate.   NO Deaths in which autopsy would confirm a suspected illness/condition that would affect surviving family members or recipients of transplanted organs.     The following deaths must be reported to the 's Office:  NO A death that may be due entirely or in part to any factors other than natural disease (recent surgery, recent trauma, suspected abuse/neglect).   NO A death that may be an accident, suicide, or homicide.     NO Any sudden, unexpected death in which there is no prior history of significant heart disease or any other condition associated with sudden death.   NO A death under suspicious, unusual, or unexpected circumstances.    NO Any death which is apparently due to natural causes but in which the  does not have a personal physician familiar with the patient s medical history, social, or environmental situation or the circumstances of the terminal event.   NO Any death apparently due to Sudden Infant Death Syndrome.     NO  Deaths that occur during, in association with, or as consequences of a diagnostic, therapeutic, or anesthetic procedure.   NO Any death in which a fracture of a major bone has occurred within the past (6) six months.   NO A death of persons note seen by their physician within 120 days of demise.     NO Any death in which the  was an inmate of a public institution or was in the custody of Law Enforcement personnel.   NO  All unexpected deaths of children   NO Solid organ donors   NO Unidentified bodies   YES Deaths of persons whose bodies are to be cremated or otherwise disposed of so that the bodies will later be unavailable for examination;   NO Deaths unattended by a physician outside of a licensed healthcare facility or licensed residential hospice program   YES Deaths occurring within 24 hours of arrival to a health care facility if death is unexpected.    NO Deaths associated with the decedent s employment.   NO Deaths attributed to acts of terrorism.   NO Any death in which there is uncertainty as to whether it is a medical examiner s care should be discussed with the medical investigator.        Body disposition: Autopsy was discussed with family member:  Spouse by phone.  Permission for autopsy was declined. Discussed with the medical examiner  Body released to the morgue.    Festus Young MD

## 2022-09-16 NOTE — DISCHARGE SUMMARY
[facility]    Discharge Summary  [facility]ensive [facility]    Date of Admission:  9/15/2022  Date of Discharge:  2022  Discharging Provider: Al Carrillo MD    Discharge Diagnoses   Active Problems:    Cardiac arrest (H)      History of Present Illness   This is a 62-year-old female brought in by EMS from the [facility] after she had a cardiac arrest.  She had been shocked a total of 7 times prior to arrival and did receive epinephrine 3 times from EMS as well.  Unfortunately, she came in with ongoing CPR and in PEA.  Admitted to ICU intubated , sedated on pressors epinephrine and norepinephrine with  cooling catheter in the right femoral vein and  arterial line in the right femoral artery.  She was provided 2 amps of bicarb as well due to her very low pH.  She was provided IV Zosyn as well due to concern for sepsis and aspiration.   She does have quite a few rib fractures and small bilateral pneumothoraces, which are from the CPR.  Of note, the ECMO team had been activated by EMS prior to arrival, however there are no ECMO beds available.    Hospital Course   Refer to Code note for full details     Significant Results and Procedures   Death    Unresulted Labs Ordered in the Past 30 Days of this Admission     Date and Time Order Name Status Description    9/15/2022 10:51 PM Urine Culture In process     9/15/2022  9:30 PM Blood Culture Peripheral Blood In process     9/15/2022  9:30 PM Blood Culture Peripheral Blood In process           Code Status   Patient  during this hospitalization       Discharge Disposition   Patient  during this admission  Condition at discharge:     Consultations This Hospital Stay   PHYSICAL THERAPY ADULT IP CONSULT  OCCUPATIONAL THERAPY ADULT IP CONSULT  NEUROLOGY IP CONSULT  CARDIOLOGY IP CONSULT    Discharge Orders   No discharge procedures on file.  Discharge Medications   Current Discharge Medication List      CONTINUE  these medications which have NOT CHANGED    Details   acetaminophen (TYLENOL) 500 MG tablet [ACETAMINOPHEN (TYLENOL) 500 MG TABLET] Take 1,000 mg by mouth every 6 (six) hours as needed for pain.      albuterol (PROVENTIL HFA;VENTOLIN HFA) 90 mcg/actuation inhaler [ALBUTEROL (PROVENTIL HFA;VENTOLIN HFA) 90 MCG/ACTUATION INHALER] Inhale 2 puffs every 4 (four) hours as needed for wheezing or shortness of breath.       amoxicillin (AMOXIL) 500 MG capsule [AMOXICILLIN (AMOXIL) 500 MG CAPSULE] Take 2,000 mg by mouth as needed (prior to dental procedures).      CALCIUM CARBONATE/VITAMIN D3 (CALCIUM 600 + D,3, ORAL) [CALCIUM CARBONATE/VITAMIN D3 (CALCIUM 600 + D,3, ORAL)] Take 1 tablet by mouth 2 (two) times a day.      cholecalciferol, vitamin D3, 1,000 unit tablet [CHOLECALCIFEROL, VITAMIN D3, 1,000 UNIT TABLET] Take 1,000 Units by mouth every evening.       cyanocobalamin 500 MCG tablet [CYANOCOBALAMIN 500 MCG TABLET] Take 500 mcg by mouth daily.       cyclobenzaprine (FLEXERIL) 10 MG tablet [CYCLOBENZAPRINE (FLEXERIL) 10 MG TABLET] Take 10 mg by mouth 3 (three) times a day as needed for muscle spasms.      !! DULoxetine (CYMBALTA) 30 MG capsule [DULOXETINE (CYMBALTA) 30 MG CAPSULE] Take 30 mg by mouth at bedtime.      !! DULoxetine (CYMBALTA) 30 MG capsule [DULOXETINE (CYMBALTA) 30 MG CAPSULE] Take 60 mg by mouth every morning.       HYDROcodone-acetaminophen 5-325 mg per tablet [HYDROCODONE-ACETAMINOPHEN 5-325 MG PER TABLET] TAKE 1 2 TABS EVERY 6 8 HOURS AS NEEDED FOR CHRONIC PAIN. MAX 3/DAY.      levothyroxine (TIROSINT) 112 MCG capsule Take 1 capsule by mouth daily      losartan (COZAAR) 50 MG tablet Take 50 mg by mouth daily      medical cannabis (Patient's own supply) [MEDICAL CANNABIS (PATIENT'S OWN SUPPLY)] 1 Dose by Other route see administration instructions. (The purpose of this order is to document that the patient reports taking medical cannabis. This is not a prescription, and is not used to certify that  the patient has a  qualifying medical condition.)      multivitamin therapeutic (THERAGRAN) tablet [MULTIVITAMIN THERAPEUTIC (THERAGRAN) TABLET] Take 1 tablet by mouth 2 (two) times a day.      triamterene-HCTZ (MAXZIDE-25) 37.5-25 MG tablet Take 1 tablet by mouth daily       !! - Potential duplicate medications found. Please discuss with provider.        Allergies   Allergies   Allergen Reactions     Ibuprofen Itching and Swelling     Throat constriction and urticaria     Nsaids (Non-Steroidal Anti-Inflammatory Drug) [Nsaids] Anaphylaxis     Sulfa (Sulfonamide Antibiotics) [Sulfa Drugs] Rash     Data   Most Recent 3 CBC's:Recent Labs   Lab Test 09/16/22  0908 09/16/22  0632 09/15/22  1952 09/15/22  1951 02/28/20  0511 02/27/20  0630   WBC  --  22.2*  --  12.3*  --  9.0   HGB 14.6 16.5* 15.6 14.1   < > 14.7   MCV  --  93  --  99  --  89   PLT  --  250  --  198  --  228    < > = values in this interval not displayed.      Most Recent 3 BMP's:  Recent Labs   Lab Test 09/16/22  0908 09/16/22  0816 09/16/22  0814 09/16/22  0745 09/16/22  0701 09/16/22  0700 09/16/22  0219 09/16/22  0204 09/15/22  1952 09/15/22  1951 03/30/22  1546 01/27/22  1158   *  --  147*  --  144  --   --  144   < > 142 141 139   POTASSIUM 2.7*  --  2.4*  --  2.7*  --   --  2.5*   < > 4.1 3.6 4.3   CHLORIDE  --   --  113*  --  113*  --   --  111*  --  104 103 101   CO2  --   --  21  --  14*  --   --  19*  --  18* 27 29   BUN  --   --   --   --   --   --   --   --   --  16 12 14   CR  --   --   --   --   --   --   --   --   --  0.81 0.66 0.68   ANIONGAP  --   --  13  --  17*  --   --  14  --  20* 11 9   ZACK  --   --   --   --   --   --   --   --   --  9.0 9.1 9.6   GLC  --  150*  --  164*  --  170*   < >  --    < > 390* 96 83    < > = values in this interval not displayed.     Most Recent 2 LFT's:  Recent Labs   Lab Test 09/15/22  1951 10/10/19  1719   * 19   * 24   ALKPHOS 112 110   BILITOTAL 0.2 0.3     Most Recent INR's and  Anticoagulation Dosing History:  Anticoagulation Dose History     Recent Dosing and Labs Latest Ref Rng & Units 10/29/2019 9/15/2022 9/16/2022    INR 0.85 - 1.15 0.93 1.30(H) 1.47(H)        Most Recent 3 Troponin's:No lab results found.  Most Recent Cholesterol Panel:  Recent Labs   Lab Test 06/17/19  1539   CHOL 157   LDL 81   HDL 57   TRIG 95     Most Recent 6 Bacteria Isolates From Any Culture (See EPIC Reports for Culture Details):No lab results found.  Most Recent TSH, T4 and A1c Labs:  Recent Labs   Lab Test 09/15/22  1951 08/31/21  1154 07/15/21  0808   TSH 0.89   < > 0.16*   T4  --   --  1.24    < > = values in this interval not displayed.     Results for orders placed or performed during the hospital encounter of 09/15/22   XR Chest Port 1 View    Narrative    EXAM: XR CHEST PORT 1 VIEW  LOCATION: [facility]  DATE/TIME: 9/15/2022 9:04 PM    INDICATION: post intubation  COMPARISON: 2/17/2012      Impression    IMPRESSION: Cardiomegaly. Pulmonary vascularity normal. Hazy airspace opacities within the right mid and upper lung field and the left upper lobe concerning for pneumonia. The lung bases are clear. No effusions. Median sternotomy. Endotracheal tube tip   in satisfactory position 3.5 cm above the evelyn. Enteric tube tip extends into the proximal stomach. Gas distended bowel loops upper abdomen.   CT Head w/o Contrast    Narrative    EXAM: CT HEAD W/O CONTRAST  LOCATION: [facility]  DATE/TIME: 9/15/2022 9:42 PM    INDICATION: cardiac arrest, confusion  COMPARISON: CT head 1/20/2011  TECHNIQUE: Routine CT Head without IV contrast. Multiplanar reformats. Dose reduction techniques were used.    FINDINGS:  INTRACRANIAL CONTENTS: No intracranial hemorrhage, extraaxial collection, or mass effect.  No CT evidence of acute infarct. Normal parenchymal attenuation. Mild generalized volume loss. No hydrocephalus.     VISUALIZED ORBITS/SINUSES/MASTOIDS: No  intraorbital abnormality. Partial opacification of the paranasal sinuses with air-fluid level in the bilateral sphenoid sinuses No middle ear or mastoid effusion.    BONES/SOFT TISSUES: No acute abnormality.      Impression    IMPRESSION:  1.  No acute intracranial abnormality. No CT evidence of hypoxic ischemic injury.   CT Chest (PE) Abdomen Pelvis w Contrast    Narrative    EXAM: CT CHEST PE ABDOMEN PELVIS W CONTRAST  LOCATION: [facility]  DATE/TIME: 9/15/2022 9:44 PM    INDICATION: cardiac arrest, PE, aortic aneurysm?  COMPARISON: Chest radiograph today. CT abdomen and pelvis 02/02/2016.  TECHNIQUE: CT chest pulmonary angiogram and routine CT abdomen pelvis with IV contrast. Arterial phase through the chest and venous phase through the abdomen and pelvis. Multiplanar reformats and MIP reconstructions were performed. Dose reduction   techniques were used.   CONTRAST: 132 mL Isovue 370    FINDINGS:  ANGIOGRAM CHEST: No pulmonary embolus. Postoperative changes of pulmonic valve repair. Large size of the central pulmonary arteries consistent with pulmonary arterial hypertension. No aortic aneurysm or dissection.    LUNGS AND PLEURA: Tiny bilateral pneumothoraces. Areas of dense consolidation of the dependent and perihilar portions of both upper and lower lobes. Background generalized patchy groundglass alveolar opacity throughout both lungs. Interlobular septal   thickening in the upper lobes.    MEDIASTINUM/AXILLAE: Endotracheal tube in satisfactory position. Nasogastric tube tip in the stomach. Enlarged heart. Poststernotomy.    CORONARY ARTERY CALCIFICATION: Mild.    HEPATOBILIARY: The complex cystic area in the right hepatic lobe has decreased in size from 02/02/2016, now measuring 3.4 cm, previously 6.9 cm. Previously this had a multilocular appearance. Now it appears unilocular. Postcholecystectomy. Extrahepatic   biliary dilatation with the common duct measuring up to 15 mm probably  on the basis of reservoir effect after cholecystectomy.    PANCREAS: Partial fatty replacement of the pancreas.    SPLEEN: Normal.    ADRENAL GLANDS: Left adrenal gland is no longer reliably visualized and may be surgically absent. Normal right adrenal gland.    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 Normal right kidney and bladder. Doyle catheter in the bladder.    BOWEL: Post gastric bypass. Generalized dilatation of small bowel but no transition point compatible with ileus. No bowel inflammation. Normal appendix.    LYMPH NODES: Normal.    VASCULATURE: Right common femoral venous catheter terminates in the infrarenal IVC. There is an additional small bore catheter which passes lateral to the right common femoral artery and extends to termination in the soft tissues medial to the lesser   trochanter of the right femur. Small amount of hyperdense fluid in the subcutaneous fat of the right groin superficial to the common femoral vessels consistent with late intravenous contrast and active bleeding which appears to arise from the right   common femoral vein. (images 210-240 of series 12)    PELVIC ORGANS: Normal.    MUSCULOSKELETAL: Acute nondisplaced fractures of the right anterior second through seventh ribs and left anterior second through sixth ribs. Degenerative changes of the spine.      Impression    IMPRESSION:  1.  No pulmonary embolus, aortic aneurysm or aortic dissection.  2.  Postoperative changes of pulmonic valve repair. Large size of the central pulmonary arteries consistent with pulmonary arterial hypertension.  3.  Tiny bilateral pneumothoraces.  4.  Acute nondisplaced fractures of the right anterior second through seventh ribs and left anterior second through sixth ribs.  5.  Findings consistent with a combination of pulmonary edema and bilateral aspiration pneumonitis.  6.  Generalized dilatation of small bowel without transition point consistent  with ileus.  7.  Right common femoral venous catheter terminates in the infrarenal IVC.  8.  Additional small bore catheter of the right groin appears malpositioned and passes lateral to the right common femoral artery to a termination in the soft tissues medial to the lesser trochanter of the right femur.  9.  Evidence for active bleeding in the right groin likely arising from the right common femoral vein.    I discussed the findings with Dr. Martin of the ER at 10:40 PM on 09/15/2022.   XR Chest Port 1 View    Narrative    EXAM: XR CHEST PORT 1 VIEW  LOCATION: [facility]  DATE/TIME: 2022 6:14 AM    INDICATION: Intubated patient.  COMPARISON: 09/15/2022 at 9:00 PM      Impression    IMPRESSION: Endotracheal tube remains in good position in mid trachea. Enteric tube remains in good position in the stomach. Sternotomy. Cardiac enlargement unchanged. Interval increase in bilateral pulmonary alveolar infiltrates and pulmonary vascular   congestion. These findings can be seen with edema or bilateral pneumonia with edema.   Cardiac Catheterization    Narrative    1. Successful ECMO cannulation, despite all resuscitative efforts, patient    at 10:10 am on 2022.

## 2022-09-16 NOTE — ED NOTES
Discussed with Dr. Cruz the ectopy of short 4-5 beat runs of vtach that pt has been having since taking over care of pt. It appeared pt had amiodarone ordered but it had been canceled. Also discussed with Dr. Cruz about pt's thermoguard parameter of 32 after discussing with [employer] who reports that is lower than usual parameter. Also discussed pt's firm abdomen. No new orders besides checking a CBC.

## 2022-09-16 NOTE — ED PROVIDER NOTES
History   Chief Complaint:  Cardiac arrest     The history is provided by the EMS personnel and the spouse. The history is limited by the condition of the patient.      Alma Rosa Mak is a 62 year old female with a history of Tetralogy of Fallot with repair x2, PVC's, Conn syndrome, undiagnosed cardiac murmurs, palpitations, chronic diastolic heart failure, morbid obesity, PERLA on CPAP, hypothyroidism, hypertension, iron deficiency anemia, hearing loss, and adenoma of left adrenal gland who presents via EMS with a cardiac arrest.    Patient reportedly had a witnessed cardiac arrest and syncope at the [employer].  states the patient and family were walking back to the car when a younger family member became upset about leaving the Mall and the patient also became upset. Then in the parking lot, the patient stated she felt like she was going to pass out and told her  to call EMS. EMS reports the call time was 1856. Patient lost consciousness and collapsed partially into the car.  [facility] security came and placed an AED which reportedly performed 3 shocks. EMS found the patient in V-fib and shocked her four more times.  They did have ROSC for approximately 3 minutes and then they lost her pulse and she was in PEA.  EMS placed an Igel airway and started the Paresh device.  They also provided the patient 3 doses of epi en route. Last epi was reportedly 7 minutes prior to arrival.  EMS activated the ECMO team as well prior to arrival and therefore did not provide any further doses of epinephrine.    Review of Systems   Neurological: Positive for syncope.   All other systems reviewed and are negative.      Allergies:  Ibuprofen   NSAIDS  Sulfa    Medications:  Tylenol  Albuterol inhaler  Flexeril  Cymbalta  Tirosint  Cozaar  Medical cannabis   Theragran  Maxzide-25    Past Medical History:     Palpitations  Hypokalemia  Atrial Premature Complex  PVC  Tetralogy of Fallot  Hypertension   Chronic diastolic  heart failure  Chronic pain syndrome  Morbid obesity  PERLA on CPAP  Undiagnosed cardiac murmurs  Conn syndrome  Depression   Acid reflux  Migraine  Osteoarthrosis  Hypothyroidism  Iron deficiency anemia   Amenorrhea  Rhinitis  Robinson  Adenoma of left adrenal gland    Past Surgical History:    Left TKA   Left TKA revision  Right TKA   Colonoscopy  Bilateral bunionectomy  Cholecystectomy  Tetralogy of fallot repair  Gastric bypass  Hysterectomy  Mitral valve replacement  Pulmonary valve replacement  Median sternotomy   Abdominoplasty  Left hallux ORIF  Tib fib repair  Left robotic adrenalectomy    Family History:    Father: heart disease, stroke  Mother: heart disease  Sister: depression   Brother: depression     Social History:  The patient arrives via EMS and presents alone to the ED  PCP: Mechelle Lugo     Physical Exam     Patient Vitals for the past 24 hrs:   BP Pulse Resp SpO2   09/15/22 2229 -- 116 18 95 %   09/15/22 2228 107/77 118 13 (!) 85 %   09/15/22 2215 -- 117 24 93 %   09/15/22 2200 93/56 120 25 96 %   09/15/22 2145 (!) 153/88 120 25 100 %   09/15/22 2015 -- 99 18 99 %   09/15/22 2010 108/64 86 19 98 %   09/15/22 2005 (!) 86/25 78 20 97 %   09/15/22 1953 -- -- -- 100 %   09/15/22 1950 -- 115 (!) 6 --       Physical Exam  Nursing note and vitals reviewed.  Constitutional:  Unresponsive and in cardiac arrest with Paresh device ongoing and assisted ventilation ongoing.  Emesis outside of the patient's mouth.  HENT:   Mouth/Throat:   Oral airway in place with emesis present.  Eyes:    Pupils midposition and fixed..   Cardiovascular:  In cardiac arrest with Paresh device ongoing.  Pulmonary/Chest:  Igel airway in place with assisted ventilation ongoing.  Abdominal:   Soft.   Musculoskeletal:  Extremities atraumatic x 4.   Neurological:   Unresponsive with a GCS of 3.  Skin:    Skin is intact. No rash noted.   Psychiatric:   Unresponsive.      Emergency Department Course   ECG #1  ECG taken at 2001, ECG read at  2002  Sinus tachycardia  Right bundle branch block   No significant change as compared to prior, dated 07/02/19.  Rate 111 bpm. WV interval 192 ms. QRS duration 168 ms. QT/QTc 358/486 ms. P-R-T axes 65 -11 83.     ECG #2  ECG taken at 2030, ECG read at 2030  Sinus tachycardia with premature supraventricular complexes and with occasional premature ventricular complexes and fusion complexes  Right bundle branch block  No significant change as compared to prior, dated 9/15/22.  Rate 104 bpm. WV interval 148 ms. QRS duration 160 ms. QT/QTc 388/510 ms. P-R-T axes 77 -28 85.     Imaging:  CT Chest (PE) Abdomen Pelvis w Contrast   Final Result   IMPRESSION:   1.  No pulmonary embolus, aortic aneurysm or aortic dissection.   2.  Postoperative changes of pulmonic valve repair. Large size of the central pulmonary arteries consistent with pulmonary arterial hypertension.   3.  Tiny bilateral pneumothoraces.   4.  Acute nondisplaced fractures of the right anterior second through seventh ribs and left anterior second through sixth ribs.   5.  Findings consistent with a combination of pulmonary edema and bilateral aspiration pneumonitis.   6.  Generalized dilatation of small bowel without transition point consistent with ileus.   7.  Right common femoral venous catheter terminates in the infrarenal IVC.   8.  Additional small bore catheter of the right groin appears malpositioned and passes lateral to the right common femoral artery to a termination in the soft tissues medial to the lesser trochanter of the right femur.   9.  Evidence for active bleeding in the right groin likely arising from the right common femoral vein.      I discussed the findings with Dr. Martin of the ER at 10:40 PM on 09/15/2022.      CT Head w/o Contrast   Final Result   IMPRESSION:   1.  No acute intracranial abnormality. No CT evidence of hypoxic ischemic injury.      XR Chest Port 1 View   Final Result   IMPRESSION: Cardiomegaly. Pulmonary  vascularity normal. Hazy airspace opacities within the right mid and upper lung field and the left upper lobe concerning for pneumonia. The lung bases are clear. No effusions. Median sternotomy. Endotracheal tube tip    in satisfactory position 3.5 cm above the evelyn. Enteric tube tip extends into the proximal stomach. Gas distended bowel loops upper abdomen.        Report per radiology    Laboratory:  Labs Ordered and Resulted from Time of ED Arrival to Time of ED Departure   BASIC METABOLIC PANEL - Abnormal       Result Value    Sodium 142      Potassium 4.1      Chloride 104      Carbon Dioxide (CO2) 18 (*)     Anion Gap 20 (*)     Urea Nitrogen 16      Creatinine 0.81      Calcium 9.0      Glucose 390 (*)     GFR Estimate 82     TROPONIN I - Abnormal    Troponin I High Sensitivity 82 (*)    CBC WITH PLATELETS AND DIFFERENTIAL - Abnormal    WBC Count 12.3 (*)     RBC Count 4.90      Hemoglobin 14.1      Hematocrit 48.5 (*)     MCV 99      MCH 28.8      MCHC 29.1 (*)     RDW 15.9 (*)     Platelet Count 198     ISTAT GASES ELECTROLYTES VENOUS POCT - Abnormal    CPB Applied No      Hematocrit POCT 46      Bicarbonate Venous POCT 19 (*)     Hemoglobin POCT 15.6      Potassium POCT 4.7      Sodium POCT 141      pCO2 Venous POCT 109 (*)     pH Venous POCT 6.84 (*)     pO2 Venous POCT 20 (*)     O2 Sat, Venous POCT 11 (*)    INR - Abnormal    INR 1.30 (*)    PARTIAL THROMBOPLASTIN TIME - Abnormal    aPTT 43 (*)    MAGNESIUM - Abnormal    Magnesium 2.9 (*)    HEPATIC FUNCTION PANEL - Abnormal    Bilirubin Total 0.2      Bilirubin Direct <0.1      Protein Total 6.5 (*)     Albumin 3.0 (*)     Alkaline Phosphatase 112       (*)      (*)    ROUTINE UA WITH MICROSCOPIC REFLEX TO CULTURE - Abnormal    Color Urine Yellow      Appearance Urine Slightly Cloudy (*)     Glucose Urine 1000  (*)     Bilirubin Urine Negative      Ketones Urine Trace (*)     Specific Gravity Urine 1.031      Blood Urine Large (*)     pH  Urine 7.0      Protein Albumin Urine 200  (*)     Urobilinogen Urine Normal      Nitrite Urine Negative      Leukocyte Esterase Urine Negative      Bacteria Urine Moderate (*)     RBC Urine 32 (*)     WBC Urine 25 (*)     Squamous Epithelials Urine 2 (*)     Hyaline Casts Urine 5 (*)    DIFFERENTIAL - Abnormal    % Neutrophils 22      % Lymphocytes 60      % Monocytes 9      % Eosinophils 1      % Basophils 0      % Metamyelocytes 3      % Myelocytes 5      NRBCs per 100 WBC 1 (*)     Absolute Neutrophils 2.7      Absolute Lymphocytes 7.4 (*)     Absolute Monocytes 1.1      Absolute Eosinophils 0.1      Absolute Basophils 0.0      Absolute Metamyelocytes 0.4 (*)     Absolute Myelocytes 0.6 (*)     Absolute NRBCs 0.1 (*)     RBC Morphology Confirmed RBC Indices      Platelet Assessment        Value: Automated Count Confirmed. Platelet morphology is normal.   ISTAT GASES LACTATE ARTERIAL POCT - Abnormal    Bicarbonate Arterial POCT 19 (*)     Lactic Acid POCT 9.3 (*)     O2 Sat, Arterial POCT 95      pCO2 Arterial POCT 48 (*)     PH Arterial POCT 7.21 (*)     pO2 Arterial POCT 91     TSH WITH FREE T4 REFLEX - Normal    TSH 0.89     COVID-19 VIRUS (CORONAVIRUS) BY PCR - Normal    SARS CoV2 PCR Negative     NT PROBNP INPATIENT - Normal    N terminal Pro BNP Inpatient 575     ERYTHROCYTE SEDIMENTATION RATE AUTO - Normal    Erythrocyte Sedimentation Rate 8     CRP INFLAMMATION - Normal    CRP Inflammation <2.9     PROCALCITONIN - Normal    Procalcitonin <0.05     BLOOD GAS ARTERIAL WITH OXYHEMOGLOBIN   BLOOD CULTURE   BLOOD CULTURE   URINE CULTURE        [facility]    -Intubation    Date/Time: 9/15/2022 7:51 PM  Performed by: Justice Martin MD  Authorized by: Justice Martin MD     Emergent condition/consent implied      PRE-PROCEDURE DETAILS     Patient status:  Unresponsive    Mallampati score:  II    Pretreatment medications:  None    Paralytics:  None      PROCEDURE DETAILS      Preoxygenation:  KARELY/LMA    CPR in progress: yes      Intubation method:  Oral    Oral intubation technique:  Direct    Laryngoscope blade:  Mac 4    Tube size (mm):  7.0    Tube type:  Cuffed    Number of attempts:  2    Ventilation between attempts: no      Cricoid pressure: yes      Tube visualized through cords: yes      PLACEMENT ASSESSMENT     ETT to lip:  24    Tube secured with:  ETT guadalupe    Breath sounds:  Equal    Placement verification: chest rise, CXR verification, direct visualization, ETCO2 detector and tube exhalation      CXR findings:  ETT in proper place    PROCEDURE    Patient Tolerance:  [facility] tolerated the procedure well [facility]    -Central Line    Date/Time: 9/15/2022 8:07 PM  Performed by: Justice Martin MD  Authorized by: Justice Martin MD     Emergent condition/consent implied      PRE-PROCEDURE DETAILS:     Hand hygiene: Hand hygiene performed prior to insertion      Sterile barrier technique: All elements of maximal sterile technique followed      Skin preparation:  2% chlorhexidine    Skin preparation agent: Skin preparation agent completely dried prior to procedure      PROCEDURE DETAILS:     Location:  R femoral    Patient position:  Flat    Procedural supplies:  Triple lumen    Landmarks identified: yes      Ultrasound guidance: no      Number of attempts:  2    Successful placement: yes      POST PROCEDURE DETAILS:      Post-procedure:  Dressing applied and line sutured    Assessment:  Blood return through all ports and free fluid flow    PROCEDURE    Patient Tolerance:  Patient tolerated the procedure well with no [facility]    -Arterial Line    Date/Time: 9/15/2022 8:08 PM  Performed by: Justice Martin MD  Authorized by: Justice Martin MD     Emergent condition/consent implied      INDICATIONS:   Indications: hemodynamic monitoring and multiple ABGs       PRE-PROCEDURE DETAILS:   Skin preparation:  2% Chlorhexidine  PROCEDURE DETAILS:    Location:  R femoral  Needle gauge:  20 G  Placement technique:  Seldinger  Number of attempts:  1  Transducer: waveform confirmed      POST PROCEDURE DETAILS:    Post-procedure:  Sutured and sterile dressing applied  CMS:  Unchanged      PROCEDURE    Patient Tolerance:  Patient tolerated the procedure well with no immediate complications      Emergency Department Course:     Reviewed:  I reviewed nursing notes, vitals, past medical history and Care Everywhere    Assessments/consults:  1944 I obtained history from EMS personnel and examined the patient as noted above.   1948 OMAR stopped for pulse check. There is a pulse detected.  1950 There is still a pulse.   1951 I perform an intubation.   1953 Epi given.   1953 Epi restarted.   1956 Bicarb given.   1956 Atropine given.   1957 Pulse check and OMAR stopped. There is cardiac activity on US. 120 bpm.    2007 I place an central line.  2008 I place an ART line.  2017 Line was placed successfully.   2021 Heart is reportedly not pumping as well as before.   2033 I step out of the room to speak with the patient's family  2039 I finish speaking with the family  2056 I spoke with Dr. Milan, cardiology, regarding the patient.   2120 I spoke with the patient's family briefly.   2143 I rechecked the patient  2147 I spoke with Dr. Dell Amaya, ICU, who agreed to admit the patient.   2251 I rechecked the patient.  2245 I spoke with radiology regarding the patient.  2246 I spoke with Dr. Johnny Macias, radiology, regarding the patient.   2255 I spoke with Dr. Amaya again regarding the patient.    Interventions:  2020 Adrenalin 0.1 mcg/kg/min IV  2046 Versed 2 mg/hr IV   2205 Zosyn 4.5 g IV  2223 Norcuron 10 mg IV  2225 Versed 4 mg/hr IV  2251 Levophed 0.03 mcg/kg/min IV  2258 Adrenalin 0.5 mcg/kg/min IV  2312 Levophed 0.1 mcg/kg/min IV  2312 Adrenalin 0.3 mcg/kg/min  IV  2314 Adrenalin 0.2 mcg/kg/min IV  2332 Adrenalin 0.1 mcg/kg/min IV  2333 NS 1000 mL IV    Disposition:  The patient was admitted to the hospital under the care of Dr. Amaya.     Impression & Plan   Medical Decision Making:  This is a 62-year-old female brought in by EMS from the [facility] after she had a cardiac arrest.  She had been shocked a total of 7 times prior to arrival and did receive epinephrine 3 times from EMS as well.  Unfortunately, she came in with ongoing CPR and in PEA.  We continued resuscitative measures and ultimately we were able to get ROSC again.  I subsequently then replaced to the Igel airway with an ET tube per the above procedure note.  The patient also seemed to improve significantly with epinephrine, and therefore I started an epinephrine drip.  I then placed a cooling catheter in the right femoral vein per the above procedure note.  After that, I placed an arterial line in the right femoral artery.  She was provided 2 amps of bicarb as well due to her very low pH.  She was provided IV Zosyn as well due to my concern for sepsis and aspiration.  I then also obtained the above CT scans to rule out any other potential problems such as an intracranial hemorrhage, pulmonary embolism, or intra-abdominal pathology such as a AAA.  She does have quite a few rib fractures and small bilateral pneumothoraces, which are from the CPR.  I spoke with the on-call cardiologist, Dr. Milan, who felt that this was likely an electrical problem rather than an ischemic problem, given her history of Tetralogy of Fallot.  Therefore she does not require heparin or going to the Cath Lab at this time.  Of note, the ECMO team had been activated by EMS prior to arrival, however there are no ECMO beds available.  She subsequently also was started on Levophed to help with her blood pressure.  Apparently, the arterial line is not in the femoral artery per the CT, but it is likely in a branch of the femoral  artery, as we do have blood pressure readings with a good waveform from the line.  I spoke with the ICU physician on a couple of occasions, Dr. Amaya, and he indicated that it was probably best to leave the arterial line as it is since we are getting readings from it.  There are currently no ICU beds available, and therefore she is going to board in the ED until a bed becomes available.  I will be signing her out to my colleague, Dr. Cruz, who will be assuming her care.  I have also utilized to the ED hypothermia order set as well.    Critical Care Time: was 70 minutes for this patient excluding procedures    Diagnosis:    ICD-10-CM    1. Cardiac arrest (H)  I46.9    2. H/O tetralogy of Fallot repair  Z87.74    3. Shock liver  K72.00    4. Closed fracture of multiple ribs of both sides, initial encounter  S22.43XA    5. Small bilateral pneumothoraces  J93.9    6. Aspiration pneumonitis (H)  J69.0    7. Acute pulmonary edema (H)  J81.0        Scribe Disclosure:  I, Marci Santiago, am serving as a scribe at 7:45 PM on 9/15/2022 to document services personally performed by Justice Martin MD based on my observations and the provider's statements to me.            Justice Martin MD  09/16/22 0007

## 2022-09-18 LAB — BACTERIA UR CULT: NO GROWTH

## 2022-09-19 LAB
HCO3 BLDV-SCNC: ABNORMAL MMOL/L
LACTATE BLD-SCNC: 12.2 MMOL/L
PCO2 BLDV: 106 MM HG (ref 40–50)
PH BLDV: <7 [PH] (ref 7.32–7.43)
PO2 BLDV: 24 MM HG (ref 25–47)
SAO2 % BLDV: ABNORMAL %

## 2022-09-21 LAB
BACTERIA BLD CULT: NO GROWTH
BACTERIA BLD CULT: NO GROWTH

## 2022-09-24 LAB
ATRIAL RATE - MUSE: 86 BPM
DIASTOLIC BLOOD PRESSURE - MUSE: NORMAL MMHG
INTERPRETATION ECG - MUSE: NORMAL
P AXIS - MUSE: 29 DEGREES
PR INTERVAL - MUSE: 186 MS
QRS DURATION - MUSE: 174 MS
QT - MUSE: 484 MS
QTC - MUSE: 579 MS
R AXIS - MUSE: 105 DEGREES
SYSTOLIC BLOOD PRESSURE - MUSE: NORMAL MMHG
T AXIS - MUSE: 136 DEGREES
VENTRICULAR RATE- MUSE: 86 BPM

## 2023-01-04 NOTE — Clinical Note
Pt came from ICU on OMAR, intubated, rhythm questionable appeared to be fine a-fib.    Pt immediately moved to table prepped per direction of Dr Jean.     CODE team continued to document.   See code documentation sheet.       Consent (Temporal Branch)/Introductory Paragraph: The rationale for Mohs was explained to the patient and consent was obtained. The risks, benefits and alternatives to therapy were discussed in detail. Specifically, the risks of damage to the temporal branch of the facial nerve, infection, scarring, bleeding, prolonged wound healing, incomplete removal, allergy to anesthesia, and recurrence were addressed. Prior to the procedure, the treatment site was clearly identified and confirmed by the patient.

## (undated) DEVICE — CANNULA ART HLS 15FR ECMO BIOL

## (undated) DEVICE — Device

## (undated) DEVICE — GW VASC 145CM .035IN SS PTFE

## (undated) DEVICE — VALVE HEMOSTASIS .096" COPILOT MECH 1003331

## (undated) DEVICE — INTRO TERUMO 6FRX25CM W/MARKER RSB603

## (undated) DEVICE — DEFIB PRO-PADZ LVP LQD GEL ADULT 8900-2105-01

## (undated) DEVICE — GUIDEWIRE 180CM .035IN VASC STR FLXB

## (undated) DEVICE — INTRO GLIDESHEATH SLENDER 6FR 10X45CM 60-1060

## (undated) DEVICE — INTRO SHTH INTRO SHTH 8FR X 11

## (undated) DEVICE — CANNULA VENOUS 25FR LONG

## (undated) DEVICE — GUIDEWIRE AMPLATZ .035X180CM STR STIFF G28781

## (undated) DEVICE — INTRO SHEATH 6FRX10CM PINNACLE RSS602

## (undated) DEVICE — INTRODUCER SHEATH 8FRX24CM ARROW FLEX CL-07824

## (undated) DEVICE — TUBING SYS ECMO CARDIOHELP 701052794

## (undated) DEVICE — TOTE ANGIO CORP PC15AT SAN32CC83O

## (undated) DEVICE — NDL PERC ENTRY THINWALL 18GA 7.0" G00166